# Patient Record
Sex: FEMALE | Race: WHITE | NOT HISPANIC OR LATINO | URBAN - METROPOLITAN AREA
[De-identification: names, ages, dates, MRNs, and addresses within clinical notes are randomized per-mention and may not be internally consistent; named-entity substitution may affect disease eponyms.]

---

## 2020-04-20 ENCOUNTER — INPATIENT (INPATIENT)
Facility: HOSPITAL | Age: 65
LOS: 2 days | Discharge: HOME | DRG: 340 | End: 2020-04-23
Attending: SURGERY | Admitting: SURGERY
Payer: COMMERCIAL

## 2020-04-20 ENCOUNTER — TRANSCRIPTION ENCOUNTER (OUTPATIENT)
Age: 65
End: 2020-04-20

## 2020-04-20 VITALS
HEIGHT: 62 IN | OXYGEN SATURATION: 98 % | SYSTOLIC BLOOD PRESSURE: 134 MMHG | WEIGHT: 138.01 LBS | HEART RATE: 114 BPM | RESPIRATION RATE: 16 BRPM | DIASTOLIC BLOOD PRESSURE: 81 MMHG | TEMPERATURE: 99 F

## 2020-04-20 LAB
ALBUMIN SERPL ELPH-MCNC: 4.1 G/DL — SIGNIFICANT CHANGE UP (ref 3.3–5)
ALP SERPL-CCNC: 52 U/L — SIGNIFICANT CHANGE UP (ref 40–120)
ALT FLD-CCNC: 17 U/L — SIGNIFICANT CHANGE UP (ref 10–45)
ANION GAP SERPL CALC-SCNC: 13 MMOL/L — SIGNIFICANT CHANGE UP (ref 5–17)
APPEARANCE UR: CLEAR — SIGNIFICANT CHANGE UP
AST SERPL-CCNC: 21 U/L — SIGNIFICANT CHANGE UP (ref 10–40)
BASOPHILS # BLD AUTO: 0 K/UL — SIGNIFICANT CHANGE UP (ref 0–0.2)
BASOPHILS NFR BLD AUTO: 0 % — SIGNIFICANT CHANGE UP (ref 0–2)
BILIRUB SERPL-MCNC: 0.5 MG/DL — SIGNIFICANT CHANGE UP (ref 0.2–1.2)
BILIRUB UR-MCNC: NEGATIVE — SIGNIFICANT CHANGE UP
BUN SERPL-MCNC: 15 MG/DL — SIGNIFICANT CHANGE UP (ref 7–23)
CALCIUM SERPL-MCNC: 9.4 MG/DL — SIGNIFICANT CHANGE UP (ref 8.4–10.5)
CHLORIDE SERPL-SCNC: 102 MMOL/L — SIGNIFICANT CHANGE UP (ref 96–108)
CO2 SERPL-SCNC: 24 MMOL/L — SIGNIFICANT CHANGE UP (ref 22–31)
COLOR SPEC: YELLOW — SIGNIFICANT CHANGE UP
CREAT SERPL-MCNC: 0.67 MG/DL — SIGNIFICANT CHANGE UP (ref 0.5–1.3)
DIFF PNL FLD: NEGATIVE — SIGNIFICANT CHANGE UP
EOSINOPHIL # BLD AUTO: 0 K/UL — SIGNIFICANT CHANGE UP (ref 0–0.5)
EOSINOPHIL NFR BLD AUTO: 0 % — SIGNIFICANT CHANGE UP (ref 0–6)
GLUCOSE SERPL-MCNC: 147 MG/DL — HIGH (ref 70–99)
GLUCOSE UR QL: ABNORMAL
HCT VFR BLD CALC: 38.7 % — SIGNIFICANT CHANGE UP (ref 34.5–45)
HGB BLD-MCNC: 12.6 G/DL — SIGNIFICANT CHANGE UP (ref 11.5–15.5)
KETONES UR-MCNC: ABNORMAL
LEUKOCYTE ESTERASE UR-ACNC: NEGATIVE — SIGNIFICANT CHANGE UP
LIDOCAIN IGE QN: 17 U/L — SIGNIFICANT CHANGE UP (ref 7–60)
LYMPHOCYTES # BLD AUTO: 0.38 K/UL — LOW (ref 1–3.3)
LYMPHOCYTES # BLD AUTO: 1.7 % — LOW (ref 13–44)
MCHC RBC-ENTMCNC: 28.7 PG — SIGNIFICANT CHANGE UP (ref 27–34)
MCHC RBC-ENTMCNC: 32.6 GM/DL — SIGNIFICANT CHANGE UP (ref 32–36)
MCV RBC AUTO: 88.2 FL — SIGNIFICANT CHANGE UP (ref 80–100)
MONOCYTES # BLD AUTO: 1.18 K/UL — HIGH (ref 0–0.9)
MONOCYTES NFR BLD AUTO: 5.3 % — SIGNIFICANT CHANGE UP (ref 2–14)
NEUTROPHILS # BLD AUTO: 20.58 K/UL — HIGH (ref 1.8–7.4)
NEUTROPHILS NFR BLD AUTO: 88.6 % — HIGH (ref 43–77)
NITRITE UR-MCNC: NEGATIVE — SIGNIFICANT CHANGE UP
PH UR: 6.5 — SIGNIFICANT CHANGE UP (ref 5–8)
PLATELET # BLD AUTO: 189 K/UL — SIGNIFICANT CHANGE UP (ref 150–400)
POTASSIUM SERPL-MCNC: 4.2 MMOL/L — SIGNIFICANT CHANGE UP (ref 3.5–5.3)
POTASSIUM SERPL-SCNC: 4.2 MMOL/L — SIGNIFICANT CHANGE UP (ref 3.5–5.3)
PROT SERPL-MCNC: 7.2 G/DL — SIGNIFICANT CHANGE UP (ref 6–8.3)
PROT UR-MCNC: ABNORMAL
RBC # BLD: 4.39 M/UL — SIGNIFICANT CHANGE UP (ref 3.8–5.2)
RBC # FLD: 11.9 % — SIGNIFICANT CHANGE UP (ref 10.3–14.5)
SODIUM SERPL-SCNC: 139 MMOL/L — SIGNIFICANT CHANGE UP (ref 135–145)
SP GR SPEC: 1.03 — HIGH (ref 1.01–1.02)
UROBILINOGEN FLD QL: NEGATIVE — SIGNIFICANT CHANGE UP
WBC # BLD: 22.34 K/UL — HIGH (ref 3.8–10.5)
WBC # FLD AUTO: 22.34 K/UL — HIGH (ref 3.8–10.5)

## 2020-04-20 PROCEDURE — 74177 CT ABD & PELVIS W/CONTRAST: CPT | Mod: 26

## 2020-04-20 PROCEDURE — 71045 X-RAY EXAM CHEST 1 VIEW: CPT | Mod: 26

## 2020-04-20 PROCEDURE — 99285 EMERGENCY DEPT VISIT HI MDM: CPT

## 2020-04-20 RX ORDER — ACETAMINOPHEN 500 MG
650 TABLET ORAL ONCE
Refills: 0 | Status: COMPLETED | OUTPATIENT
Start: 2020-04-20 | End: 2020-04-20

## 2020-04-20 RX ORDER — SODIUM CHLORIDE 9 MG/ML
1000 INJECTION INTRAMUSCULAR; INTRAVENOUS; SUBCUTANEOUS ONCE
Refills: 0 | Status: COMPLETED | OUTPATIENT
Start: 2020-04-20 | End: 2020-04-20

## 2020-04-20 RX ORDER — FAMOTIDINE 10 MG/ML
20 INJECTION INTRAVENOUS ONCE
Refills: 0 | Status: COMPLETED | OUTPATIENT
Start: 2020-04-20 | End: 2020-04-20

## 2020-04-20 RX ORDER — ONDANSETRON 8 MG/1
4 TABLET, FILM COATED ORAL ONCE
Refills: 0 | Status: COMPLETED | OUTPATIENT
Start: 2020-04-20 | End: 2020-04-20

## 2020-04-20 RX ADMIN — ONDANSETRON 4 MILLIGRAM(S): 8 TABLET, FILM COATED ORAL at 21:51

## 2020-04-20 RX ADMIN — Medication 650 MILLIGRAM(S): at 21:51

## 2020-04-20 RX ADMIN — FAMOTIDINE 20 MILLIGRAM(S): 10 INJECTION INTRAVENOUS at 21:52

## 2020-04-20 RX ADMIN — SODIUM CHLORIDE 1000 MILLILITER(S): 9 INJECTION INTRAMUSCULAR; INTRAVENOUS; SUBCUTANEOUS at 21:52

## 2020-04-20 NOTE — ED ADULT NURSE NOTE - OBJECTIVE STATEMENT
63 yo F 63 yo F c/o of abdominal pain Onset of pain few days ago.   Pain is non radiating. Reports diarrhea, and nausea. No  distress. No CP dizziness weakness or SOB. BS sounds + All 4Q Abdomen soft, nontender, nondistended. Last BM today. IV line placed labs drawn and sent. Provider at bedside evaluating.

## 2020-04-20 NOTE — ED PROVIDER NOTE - OBJECTIVE STATEMENT
64F presents with periumbilical abd pain since this AM assoc with heaving and loose stools. Abd pain non radiating, moderate intensity, sharp, not worsened by eating. Fever of 101F at home. Patient denies chest pain, SOB, cough, myalgias, urinary symptoms, extremity pain or swelling or other complaints. LAst took motrin this AM, no tylenol today.,

## 2020-04-20 NOTE — ED ADULT NURSE REASSESSMENT NOTE - NS ED NURSE REASSESS COMMENT FT1
Report received from Braeden ANGEL. AOx3, speaking in complete sentences. Unlabored, spontaneous respirations, NAD, O2 sat 95% RA. Pt denies Cp, SOB, n/v/d, abdominal pain at this time. Imaging completed, awaiting results. Pt aware of plan of care at this time.

## 2020-04-20 NOTE — ED PROVIDER NOTE - CLINICAL SUMMARY MEDICAL DECISION MAKING FREE TEXT BOX
64F presents with periumbilical abd pain since this AM assoc with heaving and loose stools. Abd pain non radiating, moderate intensity, sharp, not worsened by eating. Fever of 101F at home. considering sbo, bacterial vs viral gastroenteritis vs colitis. plan CT abd, ua, lipase, basic labs. CXR to look for covid like patterns as also possibility.

## 2020-04-20 NOTE — ED ADULT NURSE NOTE - NSIMPLEMENTINTERV_GEN_ALL_ED
Implemented All Universal Safety Interventions:  [place] to call system. Call bell, personal items and telephone within reach. Instruct patient to call for assistance. Room bathroom lighting operational. Non-slip footwear when patient is off stretcher. Physically safe environment: no spills, clutter or unnecessary equipment. Stretcher in lowest position, wheels locked, appropriate side rails in place.

## 2020-04-20 NOTE — ED PROVIDER NOTE - NS ED ROS FT
Constitutional: +fevers, no chills.  Eyes: no visual changes.  Ears: no ear drainage, no ear pain.  Nose: no nasal congestion.  Mouth/Throat: no sore throat.  Cardiovascular: no chest pain.  Respiratory: no shortness of breath, no wheezing, no cough  Gastrointestinal: + nausea, no vomiting, +diarrhea, +abdominal pain.  MSK: no flank pain, no back pain.  Genitourinary: no dysuria, no hematuria.  Skin: no rashes.  Neuro: no headache

## 2020-04-20 NOTE — ED PROVIDER NOTE - CARE PLAN
Principal Discharge DX:	Abdominal pain  Secondary Diagnosis:	Nausea  Secondary Diagnosis:	Fever Principal Discharge DX:	Appendicitis  Secondary Diagnosis:	Nausea  Secondary Diagnosis:	Fever

## 2020-04-20 NOTE — ED PROVIDER NOTE - ATTENDING CONTRIBUTION TO CARE
Nemes - 63yo F presents with periumbilical abd pain since this AM assoc with heaving and loose stools. Fever of 101F at home. No other stx. VS wnl except tachycardia, in moderate distress 2/2 pain and nausea, lungs clear, cardiac wnl, no JVD. Moist mucosae, pink conjunctivae. Abdomen soft w periumbilical and RLQ TTP, no CVAT. No pedal edema, no calf TTP. Poss appy vs colitis vs diverticulitis. Will hydrate, get labs, CT, reevaluate

## 2020-04-20 NOTE — ED PROVIDER NOTE - PHYSICAL EXAMINATION
GEN: Well appearing, well nourished, in no apparent distress.  HEAD: NCAT  HEENT: PERRL, Airway patent, EOMI, non-erythematous pharynx, no exudates, uvula midline, MMM, neck supple, no LAD, no JVD  LUNG: CTAB, no adventitious sounds, no retractions, no nasal flaring  CV: RRR, no murmurs,   Abd: soft, TTP diffusely, especially periumbilical, ND, neg hernandez sign, no rebound or guarding, BS+ in all quadrants, no CVAT  MSK: WWP, Pulses 2+ in extremities, No edema   Neuro:  AAOx3, Ambulatory with stable gait.  Skin: Warm and dry, no evidence of rash  Psych: normal mood and affect

## 2020-04-21 ENCOUNTER — RESULT REVIEW (OUTPATIENT)
Age: 65
End: 2020-04-21

## 2020-04-21 DIAGNOSIS — K37 UNSPECIFIED APPENDICITIS: ICD-10-CM

## 2020-04-21 DIAGNOSIS — Z98.890 OTHER SPECIFIED POSTPROCEDURAL STATES: Chronic | ICD-10-CM

## 2020-04-21 LAB
ANION GAP SERPL CALC-SCNC: 12 MMOL/L — SIGNIFICANT CHANGE UP (ref 5–17)
APTT BLD: 29.7 SEC — SIGNIFICANT CHANGE UP (ref 27.5–36.3)
BLD GP AB SCN SERPL QL: NEGATIVE — SIGNIFICANT CHANGE UP
BUN SERPL-MCNC: 12 MG/DL — SIGNIFICANT CHANGE UP (ref 7–23)
CALCIUM SERPL-MCNC: 8.8 MG/DL — SIGNIFICANT CHANGE UP (ref 8.4–10.5)
CHLORIDE SERPL-SCNC: 104 MMOL/L — SIGNIFICANT CHANGE UP (ref 96–108)
CO2 SERPL-SCNC: 23 MMOL/L — SIGNIFICANT CHANGE UP (ref 22–31)
CREAT SERPL-MCNC: 0.65 MG/DL — SIGNIFICANT CHANGE UP (ref 0.5–1.3)
CULTURE RESULTS: SIGNIFICANT CHANGE UP
GLUCOSE SERPL-MCNC: 141 MG/DL — HIGH (ref 70–99)
HCG UR QL: NEGATIVE — SIGNIFICANT CHANGE UP
HCT VFR BLD CALC: 35.2 % — SIGNIFICANT CHANGE UP (ref 34.5–45)
HGB BLD-MCNC: 11.5 G/DL — SIGNIFICANT CHANGE UP (ref 11.5–15.5)
INR BLD: 1.29 RATIO — HIGH (ref 0.88–1.16)
MAGNESIUM SERPL-MCNC: 1.8 MG/DL — SIGNIFICANT CHANGE UP (ref 1.6–2.6)
MCHC RBC-ENTMCNC: 28.6 PG — SIGNIFICANT CHANGE UP (ref 27–34)
MCHC RBC-ENTMCNC: 32.7 GM/DL — SIGNIFICANT CHANGE UP (ref 32–36)
MCV RBC AUTO: 87.6 FL — SIGNIFICANT CHANGE UP (ref 80–100)
NRBC # BLD: 0 /100 WBCS — SIGNIFICANT CHANGE UP (ref 0–0)
PHOSPHATE SERPL-MCNC: 2.7 MG/DL — SIGNIFICANT CHANGE UP (ref 2.5–4.5)
PLATELET # BLD AUTO: 178 K/UL — SIGNIFICANT CHANGE UP (ref 150–400)
POTASSIUM SERPL-MCNC: 4 MMOL/L — SIGNIFICANT CHANGE UP (ref 3.5–5.3)
POTASSIUM SERPL-SCNC: 4 MMOL/L — SIGNIFICANT CHANGE UP (ref 3.5–5.3)
PROTHROM AB SERPL-ACNC: 15 SEC — HIGH (ref 10–12.9)
RBC # BLD: 4.02 M/UL — SIGNIFICANT CHANGE UP (ref 3.8–5.2)
RBC # FLD: 12.1 % — SIGNIFICANT CHANGE UP (ref 10.3–14.5)
RH IG SCN BLD-IMP: POSITIVE — SIGNIFICANT CHANGE UP
RH IG SCN BLD-IMP: POSITIVE — SIGNIFICANT CHANGE UP
SARS-COV-2 RNA SPEC QL NAA+PROBE: SIGNIFICANT CHANGE UP
SODIUM SERPL-SCNC: 139 MMOL/L — SIGNIFICANT CHANGE UP (ref 135–145)
SPECIMEN SOURCE: SIGNIFICANT CHANGE UP
WBC # BLD: 21.76 K/UL — HIGH (ref 3.8–10.5)
WBC # FLD AUTO: 21.76 K/UL — HIGH (ref 3.8–10.5)

## 2020-04-21 PROCEDURE — 88304 TISSUE EXAM BY PATHOLOGIST: CPT | Mod: 26

## 2020-04-21 PROCEDURE — 88300 SURGICAL PATH GROSS: CPT | Mod: 26,59

## 2020-04-21 RX ORDER — HYDROMORPHONE HYDROCHLORIDE 2 MG/ML
1 INJECTION INTRAMUSCULAR; INTRAVENOUS; SUBCUTANEOUS EVERY 4 HOURS
Refills: 0 | Status: DISCONTINUED | OUTPATIENT
Start: 2020-04-21 | End: 2020-04-21

## 2020-04-21 RX ORDER — PIPERACILLIN AND TAZOBACTAM 4; .5 G/20ML; G/20ML
3.38 INJECTION, POWDER, LYOPHILIZED, FOR SOLUTION INTRAVENOUS EVERY 8 HOURS
Refills: 0 | Status: DISCONTINUED | OUTPATIENT
Start: 2020-04-21 | End: 2020-04-21

## 2020-04-21 RX ORDER — MAGNESIUM SULFATE 500 MG/ML
1 VIAL (ML) INJECTION ONCE
Refills: 0 | Status: COMPLETED | OUTPATIENT
Start: 2020-04-21 | End: 2020-04-21

## 2020-04-21 RX ORDER — PIPERACILLIN AND TAZOBACTAM 4; .5 G/20ML; G/20ML
3.38 INJECTION, POWDER, LYOPHILIZED, FOR SOLUTION INTRAVENOUS EVERY 8 HOURS
Refills: 0 | Status: DISCONTINUED | OUTPATIENT
Start: 2020-04-21 | End: 2020-04-23

## 2020-04-21 RX ORDER — MORPHINE SULFATE 50 MG/1
4 CAPSULE, EXTENDED RELEASE ORAL EVERY 4 HOURS
Refills: 0 | Status: DISCONTINUED | OUTPATIENT
Start: 2020-04-21 | End: 2020-04-22

## 2020-04-21 RX ORDER — ONDANSETRON 8 MG/1
4 TABLET, FILM COATED ORAL ONCE
Refills: 0 | Status: COMPLETED | OUTPATIENT
Start: 2020-04-21 | End: 2020-04-21

## 2020-04-21 RX ORDER — SODIUM CHLORIDE 9 MG/ML
1000 INJECTION, SOLUTION INTRAVENOUS
Refills: 0 | Status: DISCONTINUED | OUTPATIENT
Start: 2020-04-21 | End: 2020-04-21

## 2020-04-21 RX ORDER — ACETAMINOPHEN 500 MG
1000 TABLET ORAL EVERY 6 HOURS
Refills: 0 | Status: COMPLETED | OUTPATIENT
Start: 2020-04-21 | End: 2020-04-21

## 2020-04-21 RX ORDER — HYDROMORPHONE HYDROCHLORIDE 2 MG/ML
0.5 INJECTION INTRAMUSCULAR; INTRAVENOUS; SUBCUTANEOUS EVERY 4 HOURS
Refills: 0 | Status: DISCONTINUED | OUTPATIENT
Start: 2020-04-21 | End: 2020-04-21

## 2020-04-21 RX ORDER — PIPERACILLIN AND TAZOBACTAM 4; .5 G/20ML; G/20ML
3.38 INJECTION, POWDER, LYOPHILIZED, FOR SOLUTION INTRAVENOUS ONCE
Refills: 0 | Status: COMPLETED | OUTPATIENT
Start: 2020-04-21 | End: 2020-04-21

## 2020-04-21 RX ORDER — SODIUM CHLORIDE 9 MG/ML
1000 INJECTION INTRAMUSCULAR; INTRAVENOUS; SUBCUTANEOUS ONCE
Refills: 0 | Status: DISCONTINUED | OUTPATIENT
Start: 2020-04-21 | End: 2020-04-21

## 2020-04-21 RX ORDER — MORPHINE SULFATE 50 MG/1
2 CAPSULE, EXTENDED RELEASE ORAL ONCE
Refills: 0 | Status: DISCONTINUED | OUTPATIENT
Start: 2020-04-21 | End: 2020-04-21

## 2020-04-21 RX ORDER — HEPARIN SODIUM 5000 [USP'U]/ML
5000 INJECTION INTRAVENOUS; SUBCUTANEOUS EVERY 8 HOURS
Refills: 0 | Status: DISCONTINUED | OUTPATIENT
Start: 2020-04-21 | End: 2020-04-21

## 2020-04-21 RX ORDER — ONDANSETRON 8 MG/1
4 TABLET, FILM COATED ORAL ONCE
Refills: 0 | Status: DISCONTINUED | OUTPATIENT
Start: 2020-04-21 | End: 2020-04-21

## 2020-04-21 RX ORDER — PIPERACILLIN AND TAZOBACTAM 4; .5 G/20ML; G/20ML
3.38 INJECTION, POWDER, LYOPHILIZED, FOR SOLUTION INTRAVENOUS ONCE
Refills: 0 | Status: DISCONTINUED | OUTPATIENT
Start: 2020-04-21 | End: 2020-04-21

## 2020-04-21 RX ORDER — HYDROMORPHONE HYDROCHLORIDE 2 MG/ML
0.25 INJECTION INTRAMUSCULAR; INTRAVENOUS; SUBCUTANEOUS
Refills: 0 | Status: DISCONTINUED | OUTPATIENT
Start: 2020-04-21 | End: 2020-04-21

## 2020-04-21 RX ORDER — ACETAMINOPHEN 500 MG
1000 TABLET ORAL ONCE
Refills: 0 | Status: DISCONTINUED | OUTPATIENT
Start: 2020-04-21 | End: 2020-04-21

## 2020-04-21 RX ORDER — ENOXAPARIN SODIUM 100 MG/ML
40 INJECTION SUBCUTANEOUS DAILY
Refills: 0 | Status: DISCONTINUED | OUTPATIENT
Start: 2020-04-21 | End: 2020-04-23

## 2020-04-21 RX ORDER — MORPHINE SULFATE 50 MG/1
2 CAPSULE, EXTENDED RELEASE ORAL EVERY 4 HOURS
Refills: 0 | Status: DISCONTINUED | OUTPATIENT
Start: 2020-04-21 | End: 2020-04-22

## 2020-04-21 RX ADMIN — HEPARIN SODIUM 5000 UNIT(S): 5000 INJECTION INTRAVENOUS; SUBCUTANEOUS at 05:22

## 2020-04-21 RX ADMIN — PIPERACILLIN AND TAZOBACTAM 25 GRAM(S): 4; .5 INJECTION, POWDER, LYOPHILIZED, FOR SOLUTION INTRAVENOUS at 17:49

## 2020-04-21 RX ADMIN — Medication 100 GRAM(S): at 06:04

## 2020-04-21 RX ADMIN — Medication 400 MILLIGRAM(S): at 05:22

## 2020-04-21 RX ADMIN — MORPHINE SULFATE 2 MILLIGRAM(S): 50 CAPSULE, EXTENDED RELEASE ORAL at 00:59

## 2020-04-21 RX ADMIN — PIPERACILLIN AND TAZOBACTAM 25 GRAM(S): 4; .5 INJECTION, POWDER, LYOPHILIZED, FOR SOLUTION INTRAVENOUS at 23:22

## 2020-04-21 RX ADMIN — HYDROMORPHONE HYDROCHLORIDE 1 MILLIGRAM(S): 2 INJECTION INTRAMUSCULAR; INTRAVENOUS; SUBCUTANEOUS at 03:03

## 2020-04-21 RX ADMIN — Medication 400 MILLIGRAM(S): at 17:49

## 2020-04-21 RX ADMIN — SODIUM CHLORIDE 100 MILLILITER(S): 9 INJECTION, SOLUTION INTRAVENOUS at 03:10

## 2020-04-21 RX ADMIN — MORPHINE SULFATE 2 MILLIGRAM(S): 50 CAPSULE, EXTENDED RELEASE ORAL at 19:57

## 2020-04-21 RX ADMIN — ENOXAPARIN SODIUM 40 MILLIGRAM(S): 100 INJECTION SUBCUTANEOUS at 17:53

## 2020-04-21 RX ADMIN — PIPERACILLIN AND TAZOBACTAM 200 GRAM(S): 4; .5 INJECTION, POWDER, LYOPHILIZED, FOR SOLUTION INTRAVENOUS at 00:59

## 2020-04-21 RX ADMIN — Medication 400 MILLIGRAM(S): at 12:19

## 2020-04-21 RX ADMIN — SODIUM CHLORIDE 100 MILLILITER(S): 9 INJECTION, SOLUTION INTRAVENOUS at 05:22

## 2020-04-21 RX ADMIN — ONDANSETRON 4 MILLIGRAM(S): 8 TABLET, FILM COATED ORAL at 00:59

## 2020-04-21 RX ADMIN — Medication 400 MILLIGRAM(S): at 23:22

## 2020-04-21 NOTE — H&P ADULT - NSHPREVIEWOFSYSTEMS_GEN_ALL_CORE
REVIEW OF SYSTEMS:    CONSTITUTIONAL: No weakness, fevers or chills  EYES/ENT: No visual changes;  No vertigo or throat pain   NECK: No pain or stiffness  RESPIRATORY: No cough, wheezing, hemoptysis; No shortness of breath  CARDIOVASCULAR: No chest pain or palpitations  GASTROINTESTINAL: See HPI   GENITOURINARY: No dysuria, frequency or hematuria  NEUROLOGICAL: No numbness or weakness  SKIN: No itching, rashes

## 2020-04-21 NOTE — H&P ADULT - HISTORY OF PRESENT ILLNESS
65 yo woman with no PMH / PSH presenting with one day of periumbilical abdominal pain radiating to the RLQ associated with fever to 101 at home, nausea and emesis. She had a virtual physician visit where she was recommended to seek ED evaluation. She denies cough or SOB. Her last colonoscopy was in 2016, complicated by a splenic hematoma requiring hospitalization. No acute pathology identified on 2016 colonoscopy. Of note, patient is resistant to undergoing further screening colonoscopies. Her only medication is motrin for wrist pain.     In the ED, she is afebrile. She was initially tachycardic to 114 but is now hemodynamically normal after 1L NS bolus. On exam, she is softly tender in her RLQ without rebound or guarding. She has a leukocytosis of 22.4. CT reveals an enlarged, retrocecal appendix with an 11mm appendicolith at the base, without evidence of perforation or abscess.

## 2020-04-21 NOTE — PRE-ANESTHESIA EVALUATION ADULT - NSANTHOSAYNRD_GEN_A_CORE
No. BRANDEE screening performed.  STOP BANG Legend: 0-2 = LOW Risk; 3-4 = INTERMEDIATE Risk; 5-8 = HIGH Risk

## 2020-04-21 NOTE — PROGRESS NOTE ADULT - SUBJECTIVE AND OBJECTIVE BOX
SURGERY POST-OP NOTE    S/P laparoscopic appendectomy     S: Patient doing well, denies fevers, chills, nausea, emesis, chest pain, SOB. Pain well controlled. Not yet ambulating, not yet passing flatus or BM.    O: Vital Signs  T(C): 36.7 (04-21 @ 15:42), Max: 37.6 (04-21 @ 04:15)  HR: 83 (04-21 @ 15:42) (74 - 114)  BP: 92/58 (04-21 @ 15:42) (90/58 - 134/81)  RR: 18 (04-21 @ 15:42) (16 - 20)  SpO2: 95% (04-21 @ 15:42) (93% - 100%)  04-21-20 @ 07:01  -  04-21-20 @ 16:47  --------------------------------------------------------  IN: 1425 mL / OUT: 0 mL / NET: 1425 mL      General: alert and oriented, NAD  Resp: airway patent, respirations unlabored  CVS: regular rate and rhythm  Abdomen: soft, appropriately tender, nondistended, incisions c/d/i  Extremities: no edema  Skin: warm, dry, appropriate color                          11.5   21.76 )-----------( 178      ( 21 Apr 2020 03:23 )             35.2   04-21    139  |  104  |  12  ----------------------------<  141<H>  4.0   |  23  |  0.65    Ca    8.8      21 Apr 2020 03:23  Phos  2.7     04-21  Mg     1.8     04-21    TPro  7.2  /  Alb  4.1  /  TBili  0.5  /  DBili  x   /  AST  21  /  ALT  17  /  AlkPhos  52  04-20      A/P:  - Pain control  - F/U AM labs SURGERY POST-OP NOTE    S/P laparoscopic appendectomy     S: Patient doing well, denies fevers, chills, nausea, emesis, chest pain, SOB. Pain well controlled. Not yet ambulating, not yet passing flatus or BM.    O: Vital Signs  T(C): 36.7 (04-21 @ 15:42), Max: 37.6 (04-21 @ 04:15)  HR: 83 (04-21 @ 15:42) (74 - 114)  BP: 92/58 (04-21 @ 15:42) (90/58 - 134/81)  RR: 18 (04-21 @ 15:42) (16 - 20)  SpO2: 95% (04-21 @ 15:42) (93% - 100%)  04-21-20 @ 07:01  -  04-21-20 @ 16:47  --------------------------------------------------------  IN: 1425 mL / OUT: 0 mL / NET: 1425 mL      General: alert and oriented, NAD  Resp: airway patent, respirations unlabored  CVS: regular rate and rhythm  Abdomen: soft, appropriately tender, nondistended, incisions c/d/i  Extremities: no edema  Skin: warm, dry, appropriate color                          11.5   21.76 )-----------( 178      ( 21 Apr 2020 03:23 )             35.2   04-21    139  |  104  |  12  ----------------------------<  141<H>  4.0   |  23  |  0.65    Ca    8.8      21 Apr 2020 03:23  Phos  2.7     04-21  Mg     1.8     04-21    TPro  7.2  /  Alb  4.1  /  TBili  0.5  /  DBili  x   /  AST  21  /  ALT  17  /  AlkPhos  52  04-20 SURGERY POST-OP NOTE    S/P laparoscopic appendectomy demonstrating gangrenous appendix with large perforation and appendicolith at base    S: Patient doing well, denies fevers, chills, nausea, emesis, chest pain, SOB. Pain well controlled. Not yet ambulating, not yet passing flatus or BM.    O: Vital Signs  T(C): 36.7 (04-21 @ 15:42), Max: 37.6 (04-21 @ 04:15)  HR: 83 (04-21 @ 15:42) (74 - 114)  BP: 92/58 (04-21 @ 15:42) (90/58 - 134/81)  RR: 18 (04-21 @ 15:42) (16 - 20)  SpO2: 95% (04-21 @ 15:42) (93% - 100%)  04-21-20 @ 07:01  -  04-21-20 @ 16:47  --------------------------------------------------------  IN: 1425 mL / OUT: 0 mL / NET: 1425 mL      General: alert and oriented, NAD  Resp: airway patent, respirations unlabored  CVS: regular rate and rhythm  Abdomen: soft, appropriately tender, nondistended, incisions c/d/i  Extremities: no edema  Skin: warm, dry, appropriate color                          11.5   21.76 )-----------( 178      ( 21 Apr 2020 03:23 )             35.2   04-21    139  |  104  |  12  ----------------------------<  141<H>  4.0   |  23  |  0.65    Ca    8.8      21 Apr 2020 03:23  Phos  2.7     04-21  Mg     1.8     04-21    TPro  7.2  /  Alb  4.1  /  TBili  0.5  /  DBili  x   /  AST  21  /  ALT  17  /  AlkPhos  52  04-20

## 2020-04-21 NOTE — H&P ADULT - NSHPPHYSICALEXAM_GEN_ALL_CORE
Gen: Well-developed, well-nourished in NAD.   Neuro: AOx3  HEENT: anicteric, symmetric facial movement.   Pulm: Unlabored on room air, no cough or wheezing  CV: RRR  Abd: Soft, nondistended, RLQ tenderness without rebound or guarding. No abdominal scars.   Skin: No excoriations, ecchymosis or erythema.   Ext: NOGUERA, no edema  Psych: appropriate affect

## 2020-04-21 NOTE — ED ADULT NURSE REASSESSMENT NOTE - NS ED NURSE REASSESS COMMENT FT1
Pt admitted to surgery, diagnosis of fever, nausea, abdominal pain and appendicitis. Awaiting bed placement at this time. Pt aware of plan of care at this time.

## 2020-04-21 NOTE — PROGRESS NOTE ADULT - ASSESSMENT
64F POD0 s/p laparoscopic appendectomy for gangrenous appendix with large perforation and appendicolith at base. Doing well post-operatively with adequate pain control.    - Advance to CLD  - C/w IV Zosyn  - Pain control: IV tylenol ATC, morphine PRN  - DVT PPX: lovenox  - OOB encouraged    [facility] [id number] 64F POD0 s/p laparoscopic appendectomy for gangrenous appendix with large perforation and appendicolith at base. Doing well post-operatively with adequate pain control.    - Advance to CLD  - C/w IV Zosyn  - Pain control: IV tylenol ATC, morphine PRN  - DVT PPX: lovenox  - OOB encouraged  - F/U AM labs    [facility] [id number] 64F POD0 s/p laparoscopic appendectomy for gangrenous appendicitis with large perforation and appendicolith at base. Doing well post-operatively with adequate pain control.    - Advance to CLD  - C/w IV Zosyn  - Pain control: IV tylenol ATC, morphine PRN  - DVT PPX: lovenox  - OOB encouraged  - F/U AM labs    [facility] [id number]

## 2020-04-21 NOTE — H&P ADULT - ASSESSMENT
65 yo woman with acute appendicitis with a large appendicolith at appendiceal base     - NPO / IVF  - Zosyn   - Likely OR for lap appy, possible open     Will discuss above with Surgical Attending  VINICIUS Villareal MD PGY4  [id number]

## 2020-04-22 ENCOUNTER — TRANSCRIPTION ENCOUNTER (OUTPATIENT)
Age: 65
End: 2020-04-22

## 2020-04-22 LAB
ANION GAP SERPL CALC-SCNC: 9 MMOL/L — SIGNIFICANT CHANGE UP (ref 5–17)
BUN SERPL-MCNC: 17 MG/DL — SIGNIFICANT CHANGE UP (ref 7–23)
CALCIUM SERPL-MCNC: 8.2 MG/DL — LOW (ref 8.4–10.5)
CHLORIDE SERPL-SCNC: 107 MMOL/L — SIGNIFICANT CHANGE UP (ref 96–108)
CO2 SERPL-SCNC: 22 MMOL/L — SIGNIFICANT CHANGE UP (ref 22–31)
CREAT SERPL-MCNC: 0.74 MG/DL — SIGNIFICANT CHANGE UP (ref 0.5–1.3)
GLUCOSE SERPL-MCNC: 116 MG/DL — HIGH (ref 70–99)
HCT VFR BLD CALC: 32.5 % — LOW (ref 34.5–45)
HGB BLD-MCNC: 10.3 G/DL — LOW (ref 11.5–15.5)
MAGNESIUM SERPL-MCNC: 2.3 MG/DL — SIGNIFICANT CHANGE UP (ref 1.6–2.6)
MCHC RBC-ENTMCNC: 28.1 PG — SIGNIFICANT CHANGE UP (ref 27–34)
MCHC RBC-ENTMCNC: 31.7 GM/DL — LOW (ref 32–36)
MCV RBC AUTO: 88.8 FL — SIGNIFICANT CHANGE UP (ref 80–100)
NRBC # BLD: 0 /100 WBCS — SIGNIFICANT CHANGE UP (ref 0–0)
PHOSPHATE SERPL-MCNC: 1.7 MG/DL — LOW (ref 2.5–4.5)
PLATELET # BLD AUTO: 177 K/UL — SIGNIFICANT CHANGE UP (ref 150–400)
POTASSIUM SERPL-MCNC: 4.1 MMOL/L — SIGNIFICANT CHANGE UP (ref 3.5–5.3)
POTASSIUM SERPL-SCNC: 4.1 MMOL/L — SIGNIFICANT CHANGE UP (ref 3.5–5.3)
RBC # BLD: 3.66 M/UL — LOW (ref 3.8–5.2)
RBC # FLD: 12.5 % — SIGNIFICANT CHANGE UP (ref 10.3–14.5)
SODIUM SERPL-SCNC: 138 MMOL/L — SIGNIFICANT CHANGE UP (ref 135–145)
WBC # BLD: 10.11 K/UL — SIGNIFICANT CHANGE UP (ref 3.8–10.5)
WBC # FLD AUTO: 10.11 K/UL — SIGNIFICANT CHANGE UP (ref 3.8–10.5)

## 2020-04-22 RX ORDER — POTASSIUM PHOSPHATE, MONOBASIC POTASSIUM PHOSPHATE, DIBASIC 236; 224 MG/ML; MG/ML
15 INJECTION, SOLUTION INTRAVENOUS ONCE
Refills: 0 | Status: COMPLETED | OUTPATIENT
Start: 2020-04-22 | End: 2020-04-22

## 2020-04-22 RX ORDER — OXYCODONE HYDROCHLORIDE 5 MG/1
1 TABLET ORAL
Qty: 8 | Refills: 0
Start: 2020-04-22 | End: 2020-04-23

## 2020-04-22 RX ORDER — IBUPROFEN 200 MG
1 TABLET ORAL
Qty: 0 | Refills: 0 | DISCHARGE
Start: 2020-04-22

## 2020-04-22 RX ORDER — IBUPROFEN 200 MG
600 TABLET ORAL EVERY 6 HOURS
Refills: 0 | Status: DISCONTINUED | OUTPATIENT
Start: 2020-04-22 | End: 2020-04-23

## 2020-04-22 RX ORDER — IBUPROFEN 200 MG
0 TABLET ORAL
Qty: 0 | Refills: 0 | DISCHARGE

## 2020-04-22 RX ORDER — OXYCODONE HYDROCHLORIDE 5 MG/1
5 TABLET ORAL EVERY 4 HOURS
Refills: 0 | Status: DISCONTINUED | OUTPATIENT
Start: 2020-04-22 | End: 2020-04-23

## 2020-04-22 RX ORDER — ACETAMINOPHEN 500 MG
2 TABLET ORAL
Qty: 0 | Refills: 0 | DISCHARGE
Start: 2020-04-22

## 2020-04-22 RX ORDER — ACETAMINOPHEN 500 MG
650 TABLET ORAL EVERY 6 HOURS
Refills: 0 | Status: DISCONTINUED | OUTPATIENT
Start: 2020-04-22 | End: 2020-04-23

## 2020-04-22 RX ORDER — OXYCODONE HYDROCHLORIDE 5 MG/1
10 TABLET ORAL EVERY 4 HOURS
Refills: 0 | Status: DISCONTINUED | OUTPATIENT
Start: 2020-04-22 | End: 2020-04-23

## 2020-04-22 RX ADMIN — PIPERACILLIN AND TAZOBACTAM 25 GRAM(S): 4; .5 INJECTION, POWDER, LYOPHILIZED, FOR SOLUTION INTRAVENOUS at 17:29

## 2020-04-22 RX ADMIN — OXYCODONE HYDROCHLORIDE 5 MILLIGRAM(S): 5 TABLET ORAL at 15:22

## 2020-04-22 RX ADMIN — MORPHINE SULFATE 2 MILLIGRAM(S): 50 CAPSULE, EXTENDED RELEASE ORAL at 12:25

## 2020-04-22 RX ADMIN — POTASSIUM PHOSPHATE, MONOBASIC POTASSIUM PHOSPHATE, DIBASIC 62.5 MILLIMOLE(S): 236; 224 INJECTION, SOLUTION INTRAVENOUS at 10:21

## 2020-04-22 RX ADMIN — ENOXAPARIN SODIUM 40 MILLIGRAM(S): 100 INJECTION SUBCUTANEOUS at 12:16

## 2020-04-22 RX ADMIN — PIPERACILLIN AND TAZOBACTAM 25 GRAM(S): 4; .5 INJECTION, POWDER, LYOPHILIZED, FOR SOLUTION INTRAVENOUS at 12:15

## 2020-04-22 RX ADMIN — Medication 600 MILLIGRAM(S): at 17:28

## 2020-04-22 RX ADMIN — Medication 650 MILLIGRAM(S): at 17:28

## 2020-04-22 RX ADMIN — MORPHINE SULFATE 2 MILLIGRAM(S): 50 CAPSULE, EXTENDED RELEASE ORAL at 04:06

## 2020-04-22 RX ADMIN — PIPERACILLIN AND TAZOBACTAM 25 GRAM(S): 4; .5 INJECTION, POWDER, LYOPHILIZED, FOR SOLUTION INTRAVENOUS at 23:09

## 2020-04-22 RX ADMIN — OXYCODONE HYDROCHLORIDE 10 MILLIGRAM(S): 5 TABLET ORAL at 21:01

## 2020-04-22 NOTE — DISCHARGE NOTE PROVIDER - HOSPITAL COURSE
Pt underwent laparoscopic appendectomy on 4/21/2020 and was found to have a gangrenous appendix with large perforation and appendicolith at base.  Pt tolerated procedure well and was transferred to the recovery room and then the floor without incidence.  Pt was mainly managed for postoperative pain, and wound care, as well as close monitoring of fluid resuscitation and return of GI function.  Pt has been tolerating a diet, voiding, ambulating, hemodynamically stable, and the pain is now well controlled.   Pt is ready for discharge home in stable condition, and will follow up in two weeks as an outpatient with Dr. Dobbs. Pt underwent laparoscopic appendectomy on 4/21/2020 and was found to have a gangrenous appendix with large perforation and appendicolith at base.  Pt tolerated procedure well and was transferred to the recovery room and then the floor without incidence.  Pt was mainly managed for postoperative pain, and wound care, as well as close monitoring of fluid resuscitation and return of GI function.  Pt has been tolerating a diet, voiding, ambulating, hemodynamically stable, and the pain is now well controlled.   Pt is ready for discharge home in stable condition, and will follow up in two weeks as an outpatient with Dr. Dobbs.        Once you start passing flatus, you may take a stool softener such as miralax, if necessary.

## 2020-04-22 NOTE — DISCHARGE NOTE PROVIDER - NSDCFUADDINST_GEN_ALL_CORE_FT
PAIN CONTROL: You may take 650 mg of Tylenol every 4-6 hours. Do not exceed 4 grams of Tylenol daily. Take oxycodone as needed for severe pain, one tab every 6 hours. Do not exceed 4 tabs daily.  ANTIBIOTICS: Please complete 1 week course of Augmentin, sent to pharmacy.  BATHING: Please do not submerge wound underwater. You may shower after 48 hours and/or sponge bathe.  ACTIVITY: No heavy lifting or straining. Otherwise, you may return to your usual level of physical activity. If you are taking narcotic pain medication (such as oxycodone), do NOT drive a car, operate machinery or make important decisions.  DIET: Return to your usual diet.  NOTIFY YOUR SURGEON IF: You have any bleeding that does not stop, any pus draining from your wound, any fever (over 100.4 F) or chills, persistent nausea/vomiting, persistent diarrhea, or if your pain is not controlled on your discharge pain medications.  FOLLOW-UP:  1. Please call to make a follow-up appointment within 1-2 weeks of discharge  2. Please follow up with your primary care physician in 1-2 weeks regarding your hospitalization.

## 2020-04-22 NOTE — DISCHARGE NOTE PROVIDER - NSDCCPCAREPLAN_GEN_ALL_CORE_FT
PRINCIPAL DISCHARGE DIAGNOSIS  Diagnosis: Appendicitis  Assessment and Plan of Treatment:       SECONDARY DISCHARGE DIAGNOSES  Diagnosis: Fever  Assessment and Plan of Treatment:     Diagnosis: Nausea  Assessment and Plan of Treatment:

## 2020-04-22 NOTE — PROGRESS NOTE ADULT - ASSESSMENT
64F POD1 s/p laparoscopic appendectomy for gangrenous appendicitis with large perforation and appendicolith at base. Doing well post-operatively, tolerating CLD.    - C/w CLD, ADAT  - C/w IV Zosyn  - Monitor bowel function  - Pain control: IV tylenol ATC, morphine PRN  - DVT PPX: lovenox  - OOB encouraged  - F/U AM labs    [facility] [id number]

## 2020-04-22 NOTE — PROGRESS NOTE ADULT - ATTENDING COMMENTS
I have seen and examined the patient. I agree with the above surgery resident's note.  adv diet as danny  home on augmentin if danny po  f/u in office 2 weeks w dr elliott

## 2020-04-22 NOTE — DISCHARGE NOTE PROVIDER - CARE PROVIDER_API CALL
Stephen Dobbs)  Surgery  [address], [address]  [place] [address]  Phone: [phone]  Fax: [phone]  Follow Up Time: 2 weeks

## 2020-04-23 ENCOUNTER — TRANSCRIPTION ENCOUNTER (OUTPATIENT)
Age: 65
End: 2020-04-23

## 2020-04-23 VITALS
HEART RATE: 87 BPM | TEMPERATURE: 98 F | RESPIRATION RATE: 18 BRPM | SYSTOLIC BLOOD PRESSURE: 103 MMHG | OXYGEN SATURATION: 93 % | DIASTOLIC BLOOD PRESSURE: 60 MMHG

## 2020-04-23 LAB — SURGICAL PATHOLOGY STUDY: SIGNIFICANT CHANGE UP

## 2020-04-23 PROCEDURE — 96376 TX/PRO/DX INJ SAME DRUG ADON: CPT

## 2020-04-23 PROCEDURE — 86900 BLOOD TYPING SEROLOGIC ABO: CPT

## 2020-04-23 PROCEDURE — 87086 URINE CULTURE/COLONY COUNT: CPT

## 2020-04-23 PROCEDURE — 71045 X-RAY EXAM CHEST 1 VIEW: CPT

## 2020-04-23 PROCEDURE — 86901 BLOOD TYPING SEROLOGIC RH(D): CPT

## 2020-04-23 PROCEDURE — 80053 COMPREHEN METABOLIC PANEL: CPT

## 2020-04-23 PROCEDURE — 81001 URINALYSIS AUTO W/SCOPE: CPT

## 2020-04-23 PROCEDURE — 88304 TISSUE EXAM BY PATHOLOGIST: CPT

## 2020-04-23 PROCEDURE — 85610 PROTHROMBIN TIME: CPT

## 2020-04-23 PROCEDURE — 83735 ASSAY OF MAGNESIUM: CPT

## 2020-04-23 PROCEDURE — 88300 SURGICAL PATH GROSS: CPT

## 2020-04-23 PROCEDURE — 99285 EMERGENCY DEPT VISIT HI MDM: CPT | Mod: 25

## 2020-04-23 PROCEDURE — 87635 SARS-COV-2 COVID-19 AMP PRB: CPT

## 2020-04-23 PROCEDURE — 84100 ASSAY OF PHOSPHORUS: CPT

## 2020-04-23 PROCEDURE — 83690 ASSAY OF LIPASE: CPT

## 2020-04-23 PROCEDURE — 96374 THER/PROPH/DIAG INJ IV PUSH: CPT

## 2020-04-23 PROCEDURE — 85730 THROMBOPLASTIN TIME PARTIAL: CPT

## 2020-04-23 PROCEDURE — 96375 TX/PRO/DX INJ NEW DRUG ADDON: CPT

## 2020-04-23 PROCEDURE — 85027 COMPLETE CBC AUTOMATED: CPT

## 2020-04-23 PROCEDURE — 80048 BASIC METABOLIC PNL TOTAL CA: CPT

## 2020-04-23 PROCEDURE — 86850 RBC ANTIBODY SCREEN: CPT

## 2020-04-23 PROCEDURE — C1889: CPT

## 2020-04-23 PROCEDURE — 81025 URINE PREGNANCY TEST: CPT

## 2020-04-23 PROCEDURE — 74177 CT ABD & PELVIS W/CONTRAST: CPT

## 2020-04-23 RX ORDER — ONDANSETRON 8 MG/1
4 TABLET, FILM COATED ORAL ONCE
Refills: 0 | Status: COMPLETED | OUTPATIENT
Start: 2020-04-23 | End: 2020-04-23

## 2020-04-23 RX ORDER — ONDANSETRON 8 MG/1
1 TABLET, FILM COATED ORAL
Qty: 4 | Refills: 0
Start: 2020-04-23

## 2020-04-23 RX ADMIN — Medication 600 MILLIGRAM(S): at 06:23

## 2020-04-23 RX ADMIN — OXYCODONE HYDROCHLORIDE 5 MILLIGRAM(S): 5 TABLET ORAL at 13:44

## 2020-04-23 RX ADMIN — Medication 650 MILLIGRAM(S): at 11:39

## 2020-04-23 RX ADMIN — Medication 600 MILLIGRAM(S): at 00:53

## 2020-04-23 RX ADMIN — ENOXAPARIN SODIUM 40 MILLIGRAM(S): 100 INJECTION SUBCUTANEOUS at 11:39

## 2020-04-23 RX ADMIN — Medication 650 MILLIGRAM(S): at 00:56

## 2020-04-23 RX ADMIN — PIPERACILLIN AND TAZOBACTAM 25 GRAM(S): 4; .5 INJECTION, POWDER, LYOPHILIZED, FOR SOLUTION INTRAVENOUS at 06:54

## 2020-04-23 RX ADMIN — Medication 600 MILLIGRAM(S): at 11:39

## 2020-04-23 RX ADMIN — Medication 650 MILLIGRAM(S): at 06:23

## 2020-04-23 RX ADMIN — ONDANSETRON 4 MILLIGRAM(S): 8 TABLET, FILM COATED ORAL at 10:07

## 2020-04-23 NOTE — PROGRESS NOTE ADULT - ASSESSMENT
64F POD1 s/p laparoscopic appendectomy for gangrenous appendicitis with large perforation and appendicolith at base. Doing well post-operatively, tolerating regular diet. Awaiting return of bowel function. Ready for discharge today.    Plan  - Regular diet  - C/w IV Zosyn while inpatient --> 1 week augmentin outpatient  - Monitor bowel function  - Pain control: IV tylenol ATC, morphine PRN  - DVT PPX: lovenox  - OOB encouraged    [facility] [id number]

## 2020-04-23 NOTE — PROGRESS NOTE ADULT - ATTENDING COMMENTS
I have seen and examined the patient. I agree with the above surgery resident's note.  home today on abx

## 2020-04-23 NOTE — PROGRESS NOTE ADULT - SUBJECTIVE AND OBJECTIVE BOX
Interval Events:  - No acute events overnight  - Advanced to regular diet yesterday    S: Patient doing well, denies fevers, chills, nausea, emesis, chest pain, SOB. Not yet passing gas or having BM. Tolerating regular diet. +OOB. Pain controlled.    O: Vital Signs  T(C): 36.7 (04-22 @ 23:35), Max: 37.1 (04-22 @ 16:46)  HR: 90 (04-22 @ 23:35) (83 - 97)  BP: 103/67 (04-22 @ 23:35) (102/68 - 106/71)  RR: 18 (04-22 @ 23:35) (18 - 18)  SpO2: 97% (04-22 @ 23:35) (93% - 97%)  04-21-20 @ 07:01  -  04-22-20 @ 07:00  --------------------------------------------------------  IN: 1425 mL / OUT: 0 mL / NET: 1425 mL    04-22-20 @ 07:01  -  04-23-20 @ 00:47  --------------------------------------------------------  IN: 560 mL / OUT: 400 mL / NET: 160 mL        Physical Exam:  General: NAD  Resp: respirations unlabored  Abdomen: soft, nontender, nondistended, incisions c/d/i  Skin: warm, dry, appropriate color                          10.3   10.11 )-----------( 177      ( 22 Apr 2020 05:52 )             32.5   04-22    138  |  107  |  17  ----------------------------<  116<H>  4.1   |  22  |  0.74    Ca    8.2<L>      22 Apr 2020 05:40  Phos  1.7     04-22  Mg     2.3     04-22

## 2020-04-23 NOTE — DISCHARGE NOTE NURSING/CASE MANAGEMENT/SOCIAL WORK - PATIENT PORTAL LINK FT
You can access the FollowMyHealth Patient Portal offered by [facility] by registering at the following website: [email or web address]. By joining [facility]’s FollowMyHealth portal, you will also be able to view your health information using other applications (apps) compatible with our system.

## 2020-04-24 DIAGNOSIS — Z09 ENCOUNTER FOR FOLLOW-UP EXAMINATION AFTER COMPLETED TREATMENT FOR CONDITIONS OTHER THAN MALIGNANT NEOPLASM: ICD-10-CM

## 2020-04-24 DIAGNOSIS — K38.9 DISEASE OF APPENDIX, UNSPECIFIED: ICD-10-CM

## 2020-04-24 DIAGNOSIS — K35.32 ACUTE APPENDICITIS W/ PERFORATION AND LOCALIZED PERITONITIS, W/O ABSCESS: ICD-10-CM

## 2020-04-24 PROBLEM — Z00.00 ENCOUNTER FOR PREVENTIVE HEALTH EXAMINATION: Status: ACTIVE | Noted: 2020-04-24

## 2020-04-25 ENCOUNTER — INPATIENT (INPATIENT)
Facility: HOSPITAL | Age: 65
LOS: 3 days | Discharge: HOME | DRG: 390 | End: 2020-04-29
Attending: SURGERY | Admitting: SURGERY
Payer: COMMERCIAL

## 2020-04-25 VITALS
OXYGEN SATURATION: 97 % | TEMPERATURE: 99 F | HEIGHT: 62 IN | DIASTOLIC BLOOD PRESSURE: 67 MMHG | WEIGHT: 134.92 LBS | RESPIRATION RATE: 16 BRPM | SYSTOLIC BLOOD PRESSURE: 119 MMHG | HEART RATE: 93 BPM

## 2020-04-25 DIAGNOSIS — Z90.49 ACQUIRED ABSENCE OF OTHER SPECIFIED PARTS OF DIGESTIVE TRACT: Chronic | ICD-10-CM

## 2020-04-25 DIAGNOSIS — Z98.890 OTHER SPECIFIED POSTPROCEDURAL STATES: Chronic | ICD-10-CM

## 2020-04-25 DIAGNOSIS — R09.89 OTHER SPECIFIED SYMPTOMS AND SIGNS INVOLVING THE CIRCULATORY AND RESPIRATORY SYSTEMS: ICD-10-CM

## 2020-04-25 DIAGNOSIS — K56.609 UNSPECIFIED INTESTINAL OBSTRUCTION, UNSPECIFIED AS TO PARTIAL VERSUS COMPLETE OBSTRUCTION: ICD-10-CM

## 2020-04-25 PROBLEM — Z78.9 OTHER SPECIFIED HEALTH STATUS: Chronic | Status: ACTIVE | Noted: 2020-04-20

## 2020-04-25 LAB
ALBUMIN SERPL ELPH-MCNC: 3.1 G/DL — LOW (ref 3.3–5)
ALP SERPL-CCNC: 143 U/L — HIGH (ref 40–120)
ALT FLD-CCNC: 17 U/L — SIGNIFICANT CHANGE UP (ref 10–45)
ANION GAP SERPL CALC-SCNC: 21 MMOL/L — HIGH (ref 5–17)
AST SERPL-CCNC: 23 U/L — SIGNIFICANT CHANGE UP (ref 10–40)
BASE EXCESS BLDV CALC-SCNC: 1.7 MMOL/L — SIGNIFICANT CHANGE UP (ref -2–2)
BASOPHILS # BLD AUTO: 0 K/UL — SIGNIFICANT CHANGE UP (ref 0–0.2)
BASOPHILS NFR BLD AUTO: 0 % — SIGNIFICANT CHANGE UP (ref 0–2)
BILIRUB SERPL-MCNC: 0.3 MG/DL — SIGNIFICANT CHANGE UP (ref 0.2–1.2)
BUN SERPL-MCNC: 20 MG/DL — SIGNIFICANT CHANGE UP (ref 7–23)
CA-I SERPL-SCNC: 1.2 MMOL/L — SIGNIFICANT CHANGE UP (ref 1.12–1.3)
CALCIUM SERPL-MCNC: 9 MG/DL — SIGNIFICANT CHANGE UP (ref 8.4–10.5)
CHLORIDE BLDV-SCNC: 101 MMOL/L — SIGNIFICANT CHANGE UP (ref 96–108)
CHLORIDE SERPL-SCNC: 98 MMOL/L — SIGNIFICANT CHANGE UP (ref 96–108)
CO2 BLDV-SCNC: 29 MMOL/L — SIGNIFICANT CHANGE UP (ref 22–30)
CO2 SERPL-SCNC: 21 MMOL/L — LOW (ref 22–31)
CREAT SERPL-MCNC: 0.67 MG/DL — SIGNIFICANT CHANGE UP (ref 0.5–1.3)
D DIMER BLD IA.RAPID-MCNC: 3087 NG/ML DDU — HIGH
DACRYOCYTES BLD QL SMEAR: SLIGHT — SIGNIFICANT CHANGE UP
EOSINOPHIL # BLD AUTO: 0 K/UL — SIGNIFICANT CHANGE UP (ref 0–0.5)
EOSINOPHIL NFR BLD AUTO: 0 % — SIGNIFICANT CHANGE UP (ref 0–6)
GAS PNL BLDV: 139 MMOL/L — SIGNIFICANT CHANGE UP (ref 135–145)
GAS PNL BLDV: SIGNIFICANT CHANGE UP
GAS PNL BLDV: SIGNIFICANT CHANGE UP
GLUCOSE BLDV-MCNC: 102 MG/DL — HIGH (ref 70–99)
GLUCOSE SERPL-MCNC: 105 MG/DL — HIGH (ref 70–99)
HCO3 BLDV-SCNC: 27 MMOL/L — SIGNIFICANT CHANGE UP (ref 21–29)
HCT VFR BLD CALC: 37.8 % — SIGNIFICANT CHANGE UP (ref 34.5–45)
HCT VFR BLDA CALC: 42 % — SIGNIFICANT CHANGE UP (ref 39–50)
HGB BLD CALC-MCNC: 13.6 G/DL — SIGNIFICANT CHANGE UP (ref 11.5–15.5)
HGB BLD-MCNC: 12.5 G/DL — SIGNIFICANT CHANGE UP (ref 11.5–15.5)
LACTATE BLDV-MCNC: 1.5 MMOL/L — SIGNIFICANT CHANGE UP (ref 0.7–2)
LYMPHOCYTES # BLD AUTO: 0.57 K/UL — LOW (ref 1–3.3)
LYMPHOCYTES # BLD AUTO: 6.1 % — LOW (ref 13–44)
MANUAL SMEAR VERIFICATION: SIGNIFICANT CHANGE UP
MCHC RBC-ENTMCNC: 28.9 PG — SIGNIFICANT CHANGE UP (ref 27–34)
MCHC RBC-ENTMCNC: 33.1 GM/DL — SIGNIFICANT CHANGE UP (ref 32–36)
MCV RBC AUTO: 87.5 FL — SIGNIFICANT CHANGE UP (ref 80–100)
MONOCYTES # BLD AUTO: 0.81 K/UL — SIGNIFICANT CHANGE UP (ref 0–0.9)
MONOCYTES NFR BLD AUTO: 8.7 % — SIGNIFICANT CHANGE UP (ref 2–14)
NEUTROPHILS # BLD AUTO: 7.97 K/UL — HIGH (ref 1.8–7.4)
NEUTROPHILS NFR BLD AUTO: 85.2 % — HIGH (ref 43–77)
PCO2 BLDV: 49 MMHG — SIGNIFICANT CHANGE UP (ref 35–50)
PH BLDV: 7.37 — SIGNIFICANT CHANGE UP (ref 7.35–7.45)
PLAT MORPH BLD: NORMAL — SIGNIFICANT CHANGE UP
PLATELET # BLD AUTO: 301 K/UL — SIGNIFICANT CHANGE UP (ref 150–400)
PO2 BLDV: 22 MMHG — LOW (ref 25–45)
POIKILOCYTOSIS BLD QL AUTO: SLIGHT — SIGNIFICANT CHANGE UP
POLYCHROMASIA BLD QL SMEAR: SLIGHT — SIGNIFICANT CHANGE UP
POTASSIUM BLDV-SCNC: 3.3 MMOL/L — LOW (ref 3.5–5.3)
POTASSIUM SERPL-MCNC: 3.6 MMOL/L — SIGNIFICANT CHANGE UP (ref 3.5–5.3)
POTASSIUM SERPL-SCNC: 3.6 MMOL/L — SIGNIFICANT CHANGE UP (ref 3.5–5.3)
PROT SERPL-MCNC: 6.6 G/DL — SIGNIFICANT CHANGE UP (ref 6–8.3)
RBC # BLD: 4.32 M/UL — SIGNIFICANT CHANGE UP (ref 3.8–5.2)
RBC # FLD: 12.2 % — SIGNIFICANT CHANGE UP (ref 10.3–14.5)
RBC BLD AUTO: ABNORMAL
SAO2 % BLDV: 33 % — LOW (ref 67–88)
SODIUM SERPL-SCNC: 140 MMOL/L — SIGNIFICANT CHANGE UP (ref 135–145)
WBC # BLD: 9.36 K/UL — SIGNIFICANT CHANGE UP (ref 3.8–10.5)
WBC # FLD AUTO: 9.36 K/UL — SIGNIFICANT CHANGE UP (ref 3.8–10.5)

## 2020-04-25 PROCEDURE — 71275 CT ANGIOGRAPHY CHEST: CPT | Mod: 26

## 2020-04-25 PROCEDURE — 71045 X-RAY EXAM CHEST 1 VIEW: CPT | Mod: 26

## 2020-04-25 PROCEDURE — 74177 CT ABD & PELVIS W/CONTRAST: CPT | Mod: 26

## 2020-04-25 PROCEDURE — 71045 X-RAY EXAM CHEST 1 VIEW: CPT | Mod: 26,77

## 2020-04-25 PROCEDURE — 99285 EMERGENCY DEPT VISIT HI MDM: CPT

## 2020-04-25 RX ORDER — HYDROMORPHONE HYDROCHLORIDE 2 MG/ML
0.5 INJECTION INTRAMUSCULAR; INTRAVENOUS; SUBCUTANEOUS ONCE
Refills: 0 | Status: DISCONTINUED | OUTPATIENT
Start: 2020-04-25 | End: 2020-04-25

## 2020-04-25 RX ORDER — ONDANSETRON 8 MG/1
4 TABLET, FILM COATED ORAL ONCE
Refills: 0 | Status: COMPLETED | OUTPATIENT
Start: 2020-04-25 | End: 2020-04-25

## 2020-04-25 RX ORDER — SODIUM CHLORIDE 9 MG/ML
1000 INJECTION INTRAMUSCULAR; INTRAVENOUS; SUBCUTANEOUS
Refills: 0 | Status: DISCONTINUED | OUTPATIENT
Start: 2020-04-25 | End: 2020-04-25

## 2020-04-25 RX ORDER — ENOXAPARIN SODIUM 100 MG/ML
40 INJECTION SUBCUTANEOUS DAILY
Refills: 0 | Status: DISCONTINUED | OUTPATIENT
Start: 2020-04-25 | End: 2020-04-29

## 2020-04-25 RX ORDER — PIPERACILLIN AND TAZOBACTAM 4; .5 G/20ML; G/20ML
3.38 INJECTION, POWDER, LYOPHILIZED, FOR SOLUTION INTRAVENOUS EVERY 8 HOURS
Refills: 0 | Status: DISCONTINUED | OUTPATIENT
Start: 2020-04-25 | End: 2020-04-28

## 2020-04-25 RX ORDER — MORPHINE SULFATE 50 MG/1
4 CAPSULE, EXTENDED RELEASE ORAL ONCE
Refills: 0 | Status: DISCONTINUED | OUTPATIENT
Start: 2020-04-25 | End: 2020-04-25

## 2020-04-25 RX ORDER — SODIUM CHLORIDE 9 MG/ML
1000 INJECTION, SOLUTION INTRAVENOUS
Refills: 0 | Status: DISCONTINUED | OUTPATIENT
Start: 2020-04-25 | End: 2020-04-26

## 2020-04-25 RX ORDER — TETRACAINE/BENZOCAINE/BUTAMBEN 2%-14%-2%
1 OINTMENT (GRAM) TOPICAL ONCE
Refills: 0 | Status: COMPLETED | OUTPATIENT
Start: 2020-04-25 | End: 2020-04-25

## 2020-04-25 RX ORDER — SODIUM CHLORIDE 9 MG/ML
1000 INJECTION INTRAMUSCULAR; INTRAVENOUS; SUBCUTANEOUS ONCE
Refills: 0 | Status: COMPLETED | OUTPATIENT
Start: 2020-04-25 | End: 2020-04-25

## 2020-04-25 RX ORDER — ACETAMINOPHEN 500 MG
975 TABLET ORAL ONCE
Refills: 0 | Status: COMPLETED | OUTPATIENT
Start: 2020-04-25 | End: 2020-04-25

## 2020-04-25 RX ORDER — PIPERACILLIN AND TAZOBACTAM 4; .5 G/20ML; G/20ML
3.38 INJECTION, POWDER, LYOPHILIZED, FOR SOLUTION INTRAVENOUS ONCE
Refills: 0 | Status: COMPLETED | OUTPATIENT
Start: 2020-04-25 | End: 2020-04-25

## 2020-04-25 RX ADMIN — SODIUM CHLORIDE 125 MILLILITER(S): 9 INJECTION, SOLUTION INTRAVENOUS at 15:37

## 2020-04-25 RX ADMIN — ONDANSETRON 4 MILLIGRAM(S): 8 TABLET, FILM COATED ORAL at 12:02

## 2020-04-25 RX ADMIN — HYDROMORPHONE HYDROCHLORIDE 0.5 MILLIGRAM(S): 2 INJECTION INTRAMUSCULAR; INTRAVENOUS; SUBCUTANEOUS at 18:18

## 2020-04-25 RX ADMIN — Medication 1 SPRAY(S): at 14:57

## 2020-04-25 RX ADMIN — PIPERACILLIN AND TAZOBACTAM 25 GRAM(S): 4; .5 INJECTION, POWDER, LYOPHILIZED, FOR SOLUTION INTRAVENOUS at 21:03

## 2020-04-25 RX ADMIN — SODIUM CHLORIDE 1000 MILLILITER(S): 9 INJECTION INTRAMUSCULAR; INTRAVENOUS; SUBCUTANEOUS at 12:02

## 2020-04-25 RX ADMIN — PIPERACILLIN AND TAZOBACTAM 200 GRAM(S): 4; .5 INJECTION, POWDER, LYOPHILIZED, FOR SOLUTION INTRAVENOUS at 15:25

## 2020-04-25 RX ADMIN — Medication 975 MILLIGRAM(S): at 12:45

## 2020-04-25 RX ADMIN — ONDANSETRON 4 MILLIGRAM(S): 8 TABLET, FILM COATED ORAL at 14:22

## 2020-04-25 NOTE — ED PROVIDER NOTE - ATTENDING CONTRIBUTION TO CARE
64F s/p recent appendectomy presetnign to the ED w/ RUQ abdominal pain, nausea/vomiting x 2 days, states she was discharged 3 days ago with Augmentin/Zofran/ pain medications, but unable to toelrate PO, including meds. Denies any exacerbating/alleviating factors for her pain. Has not had pain like this before, dull achy, non-raidating.     PHYSICAL EXAMINATION:  VITALS REVIEWED.    GENERALIZED APPEARANCE:  Comfortable, no acute distress, ambulating without difficulty  SKIN:  Warm, dry, no cyanosis  HEAD:  No obvious scalp lesions  EYES:  Conjunctiva pink, no icterus  ENMT:  Mucus membranes moist, no stridor  NECK:  Supple, non-tender  CHEST AND RESPIRATORY:  L basilar rales, normal respiratory effort, speaking in full sentences  HEART AND CARDIOVASCULAR:  Regular rate, no obvious murmur  ABDOMEN AND GI: Abdomen soft, RUQ TTP, no guarding, surgical incisions without erythema, dressing C/D/I  EXTREMITIES:  No deformity, edema, or calf tenderness  NEURO: AAOx3, gross motor and sensory intact    Impression:  R/o SBO, r/o abscess, r/o PNA, r/o other etiologies; labs, imaging, monitoring, re-eval, will make surgery aware their patient is representing to the ER.

## 2020-04-25 NOTE — H&P ADULT - NSHPLABSRESULTS_GEN_ALL_CORE
LABS  CBC (04-25 @ 12:13)                              12.5                           9.36    )----------------(  301        85.2<H>% Neutrophils, 6.1<L>% Lymphocytes, ANC: 7.97<H>                              37.8      BMP (04-25 @ 12:13)             140     |  98      |  20    		Ca++ --      Ca 9.0                ---------------------------------( 105<H>		Mg 2.1                3.6     |  21<L>   |  0.67  			Ph 3.0       LFTs (04-25 @ 12:13)      TPro 6.6 / Alb 3.1<L> / TBili 0.3 / DBili -- / AST 23 / ALT 17 / AlkPhos 143<H>    VBG (04-25 @ 12:13)     7.37 / 49 / 22<L> / 27 / 1.7 / 33<L>%     Lactate: 1.5    --------------------------------------------------------------------------------------------    MICROBIOLOGY    -> .Urine Clean Catch (Midstream) Culture (04-21 @ 00:45)     NG    NG    >=3 organisms. Probable collection contamination.      --------------------------------------------------------------------------------------------  IMAGING  < from: CT Abdomen and Pelvis w/ IV Cont (04.25.20 @ 13:24) >  BOWEL: Multiple fluid-filled dilated loops of small bowel with a transition point in the lower abdomen and (3-74) and distally collapsed loops of small bowel consistent with small bowel obstruction. Right lower quadrant surgical sutures corresponding to post appendectomy.   PERITONEUM: Small volume ascites. No abscess.  VESSELS: Within normal limits.  RETROPERITONEUM/LYMPH NODES: No lymphadenopathy.    ABDOMINAL WALL: Postsurgical changes.  BONES: Degenerative changes.    CHEST:   LUNGS AND LARGE AIRWAYS: Patent central airways. Bibasilar passive atelectasis of lung bases. No nodules.  PLEURA: Bilateral pleural effusions.  VESSELS: Good opacification of pulmonary arteries. No evidence of pulmonary artery embolus.  HEART: Heart size is normal. No pericardial effusion.  MEDIASTINUM AND WAQAS: No lymphadenopathy.  CHEST WALL AND LOWER NECK: Within normal limits.    IMPRESSION:   Clear Lungs (C19V-0).  No pulmonary embolism.  Small bowel obstruction with a transition point in the distal small bowel. No abscess.   Small volume ascites.        --------------------------------------------------------------------------------------------

## 2020-04-25 NOTE — ED PROVIDER NOTE - OBJECTIVE STATEMENT
64F no PMH s/p lap appendectomy with Dr. Dobbs on 4/21 for gangrenous appendix with large perforation and appendicolith at base p/w RUQ abdominal pain with n/v x 2 days. Patient was discharged 3 days ago with Augmentin, Zofran, pain meds. Pt states she took 2 doses of Augmentin, but developed burning abdominal pain. Has been unable to tolerate PO for 2 days 2/2 nausea, including all meds. Pt states she's had no change in her abdominal pain since being discharged. Also reports some diarrhea yesterday after taking stool softener. Was instructed by Dr. Dobbs to present to ER, to be evaluated by Dr. Parikh.

## 2020-04-25 NOTE — ED PROVIDER NOTE - CLINICAL SUMMARY MEDICAL DECISION MAKING FREE TEXT BOX
Aung, PGY1 - 64F s/p lap appendectomy with Dr. Dobbs 4 days ago for gangrenous appendix with large perforation and appendicolith at base p/w RUQ abdominal pain with n/v x 2 days. Has been unable to tolerate PO, including Augmentin, Zofran, and pain meds. Also reports SOB x today. Vitals WNL. Will evaluate for abscess, PNA. Lower suspicion for PE, but will evaluate with dimer given new SOB and recent surgery. Plan: labs, cxr, CT A/P, surgery consult.

## 2020-04-25 NOTE — ED PROVIDER NOTE - NS ED ROS FT
General: Denies fevers or chills  Eyes: Denies vision changes  ENMT: Denies trouble swallowing or speaking, or sore throat  CV: Denies chest pain or palpitations  Resp: +SOB. Denies cough  GI: +Abdominal pain, nausea, vomiting, diarrhea. No constipation   : Denies painful urination, increased urinary frequency, or blood in urine  Skin: Denies new rashes  Neuro: Denies headache, numbness, or weakness  MSK: Denies recent trauma

## 2020-04-25 NOTE — ED ADULT NURSE NOTE - OBJECTIVE STATEMENT
64 y.o F A&OX3 with PMH of appendectomy (4/21) presents to the ED c.o n/v/d, weakness and SOB since Friday. Pt. reports she had appendectomy on Tuesday for gangrenous appendix with large perforation and appendicolith at base. States she was discharged jada on Thursday on Augmentin, Zofran and oxycodone. Reports she became nauseous and was unable to keep anything down so she stopped taking Augmentin. Then on Friday she developed diarrhea and has been having multiple episodes a day. Has not been able to tolerate PO intake. Endorses SOB during exertion and weakness. Pt. reports soreness to R side of abd which she reports has been improving since procedure. Pt. reports she spoke with Dr. Dobbs, and was advised to come to ED to be evaluated by Dr. Parikh. Denies fevers, CP, cough, numbness/tingling sensation at home. IV acces obtained. Safety and comfort provided. 64 y.o F A&OX3 with PMH of appendectomy (4/21) presents to the ED c.o n/v/d, and weakness since Friday. Pt. reports she had appendectomy on Tuesday for gangrenous appendix with large perforation and appendicolith at base. States she was discharged jada on Thursday on Augmentin, Zofran and oxycodone. Reports she became nauseous and was unable to keep anything down so she stopped taking Augmentin. Then on Friday she developed diarrhea and has been having multiple episodes a day. Has not been able to tolerate PO intake. Endorses weakness at this time. Pt. reports soreness to R side of abd which she reports has been improving since procedure. Pt. reports she spoke with Dr. Dobbs, and was advised to come to ED to be evaluated by Dr. Parikh. Denies fevers, CP, cough, numbness/tingling sensation at home. IV acces obtained. Safety and comfort provided.

## 2020-04-25 NOTE — ED ADULT TRIAGE NOTE - CHIEF COMPLAINT QUOTE
had appendectomy on Tuesday 4/21 and is having abdominal pain, n+v since Friday today feels short of breath

## 2020-04-25 NOTE — H&P ADULT - NSHPPHYSICALEXAM_GEN_ALL_CORE
Vitals:   T(C): 37.1 (04-25-20 @ 14:20), Max: 37.2 (04-25-20 @ 11:56)  HR: 87 (04-25-20 @ 14:20) (86 - 93)  BP: 121/65 (04-25-20 @ 14:20) (119/64 - 121/65)  RR: 22 (04-25-20 @ 14:20) (16 - 22)  SpO2: 95% (04-25-20 @ 14:20) (95% - 98%)  CAPILLARY BLOOD GLUCOSE    Height (cm): 157.48 (04-25 @ 10:45)  Weight (kg): 61.2 (04-25 @ 10:45)  BMI (kg/m2): 24.7 (04-25 @ 10:45)  BSA (m2): 1.62 (04-25 @ 10:45)    PHYSICAL EXAM:  General: Well-appearing, appears uncomfortable.  Neuro: A+Ox3  HEENT: NC/AT, EOMI  Resp: Good effort, CTA b/l  GI/Abd: Soft, distended, mild abdominal tenderness, no rebound/guarding, no masses palpated, incisions clean, dry and intact  Vascular: All 4 extremities warm.  Skin: Intact, no breakdown  Lymphatic/Nodes: No palpable lymphadenopathy  Musculoskeletal: All 4 extremities moving spontaneously, no limitations  --------------------------------------------------------------------------------------------

## 2020-04-25 NOTE — H&P ADULT - HISTORY OF PRESENT ILLNESS
Laura Toure is a 64 year old F with a pmhx/pshx gangrenous appendicitis s/p laparoscopic appendectomy on 04/21/20 who was presents with persistent nausea and vomiting since discharge on 04/23/20. The patient reports being unable to tolerate PO intake since she was sent home. She denies any associated fevers or chills, chest pain or localizing signs. She also reports some associated dyspnea and rapid breathing. She denies worsening abdominal pain but reports abdominal distension.

## 2020-04-25 NOTE — H&P ADULT - ASSESSMENT
Laura Toure is a 64 year old F with a recent history of gangrenous appendicitis s/p laparoscopic appendectomy 4 days ago who now presents with persistent nausea and vomiting since discharge 2 days ago. The patient reports mild abdominal pain, but persistent nausea, and vomiting. She also reports some associated dyspnea. The patient is afebrile and HD stable, she has some mild abdominal tenderness but notable distension, incisions CDI. An NGT was inserted at the bedside and approx. 1 L of bilious gastric content was evacuated on insertion. Labs are WNL. A CTPA was obtained to rule out a PE, this was negative. A CT abdomen/pelvis revealed a small bowel obstruction with transition point in the distal small bowel, no other focal abnormalities visualized. Patient possibly has an adhesive small bowel obstruction given recent surgical history and intra-op findings notable for gangrenous appendix with perforation. No emergent surgical intervention warranted at this time.    PLAN:  - Admission to Surgery - Green Team: Dr. ANUEL Parikh  - NPO, NGT decompression.   - Serial abdominal exams.  - IV fluid resuscitation, continue antibiotics (Zosyn resumed)  - Patient seen/examined with Sr. Resident, Dr. VINICIUS Swartz  - Plan discussed with Attending, Dr. ANUEL Parikh    Please contact Surgery - Green Team (#[phone]) with any questions or concerns.

## 2020-04-26 LAB
ANION GAP SERPL CALC-SCNC: 20 MMOL/L — HIGH (ref 5–17)
APTT BLD: 27.4 SEC — LOW (ref 27.5–36.3)
BLD GP AB SCN SERPL QL: NEGATIVE — SIGNIFICANT CHANGE UP
BUN SERPL-MCNC: 20 MG/DL — SIGNIFICANT CHANGE UP (ref 7–23)
CALCIUM SERPL-MCNC: 8.5 MG/DL — SIGNIFICANT CHANGE UP (ref 8.4–10.5)
CHLORIDE SERPL-SCNC: 100 MMOL/L — SIGNIFICANT CHANGE UP (ref 96–108)
CO2 SERPL-SCNC: 21 MMOL/L — LOW (ref 22–31)
CREAT SERPL-MCNC: 0.62 MG/DL — SIGNIFICANT CHANGE UP (ref 0.5–1.3)
GLUCOSE SERPL-MCNC: 95 MG/DL — SIGNIFICANT CHANGE UP (ref 70–99)
HCT VFR BLD CALC: 35.5 % — SIGNIFICANT CHANGE UP (ref 34.5–45)
HCV AB S/CO SERPL IA: 0.09 S/CO — SIGNIFICANT CHANGE UP (ref 0–0.99)
HCV AB SERPL-IMP: SIGNIFICANT CHANGE UP
HGB BLD-MCNC: 11.5 G/DL — SIGNIFICANT CHANGE UP (ref 11.5–15.5)
INR BLD: 0.98 RATIO — SIGNIFICANT CHANGE UP (ref 0.88–1.16)
MAGNESIUM SERPL-MCNC: 2 MG/DL — SIGNIFICANT CHANGE UP (ref 1.6–2.6)
MCHC RBC-ENTMCNC: 28.2 PG — SIGNIFICANT CHANGE UP (ref 27–34)
MCHC RBC-ENTMCNC: 32.4 GM/DL — SIGNIFICANT CHANGE UP (ref 32–36)
MCV RBC AUTO: 87 FL — SIGNIFICANT CHANGE UP (ref 80–100)
NRBC # BLD: 0 /100 WBCS — SIGNIFICANT CHANGE UP (ref 0–0)
PHOSPHATE SERPL-MCNC: 2.8 MG/DL — SIGNIFICANT CHANGE UP (ref 2.5–4.5)
PLATELET # BLD AUTO: 317 K/UL — SIGNIFICANT CHANGE UP (ref 150–400)
POTASSIUM SERPL-MCNC: 3.3 MMOL/L — LOW (ref 3.5–5.3)
POTASSIUM SERPL-SCNC: 3.3 MMOL/L — LOW (ref 3.5–5.3)
PROTHROM AB SERPL-ACNC: 11.3 SEC — SIGNIFICANT CHANGE UP (ref 10–13.1)
RBC # BLD: 4.08 M/UL — SIGNIFICANT CHANGE UP (ref 3.8–5.2)
RBC # FLD: 12.4 % — SIGNIFICANT CHANGE UP (ref 10.3–14.5)
RH IG SCN BLD-IMP: POSITIVE — SIGNIFICANT CHANGE UP
SODIUM SERPL-SCNC: 141 MMOL/L — SIGNIFICANT CHANGE UP (ref 135–145)
WBC # BLD: 11.2 K/UL — HIGH (ref 3.8–10.5)
WBC # FLD AUTO: 11.2 K/UL — HIGH (ref 3.8–10.5)

## 2020-04-26 RX ORDER — TETRACAINE/BENZOCAINE/BUTAMBEN 2%-14%-2%
1 OINTMENT (GRAM) TOPICAL
Refills: 0 | Status: DISCONTINUED | OUTPATIENT
Start: 2020-04-26 | End: 2020-04-27

## 2020-04-26 RX ORDER — ACETAMINOPHEN 500 MG
1000 TABLET ORAL ONCE
Refills: 0 | Status: COMPLETED | OUTPATIENT
Start: 2020-04-26 | End: 2020-04-26

## 2020-04-26 RX ORDER — ONDANSETRON 8 MG/1
4 TABLET, FILM COATED ORAL ONCE
Refills: 0 | Status: COMPLETED | OUTPATIENT
Start: 2020-04-26 | End: 2020-04-26

## 2020-04-26 RX ORDER — BENZOCAINE AND MENTHOL 5; 1 G/100ML; G/100ML
1 LIQUID ORAL
Refills: 0 | Status: DISCONTINUED | OUTPATIENT
Start: 2020-04-26 | End: 2020-04-27

## 2020-04-26 RX ORDER — POTASSIUM CHLORIDE 20 MEQ
10 PACKET (EA) ORAL
Refills: 0 | Status: COMPLETED | OUTPATIENT
Start: 2020-04-26 | End: 2020-04-26

## 2020-04-26 RX ORDER — DEXTROSE MONOHYDRATE, SODIUM CHLORIDE, AND POTASSIUM CHLORIDE 50; .745; 4.5 G/1000ML; G/1000ML; G/1000ML
1000 INJECTION, SOLUTION INTRAVENOUS
Refills: 0 | Status: DISCONTINUED | OUTPATIENT
Start: 2020-04-26 | End: 2020-04-28

## 2020-04-26 RX ADMIN — Medication 100 MILLIEQUIVALENT(S): at 18:09

## 2020-04-26 RX ADMIN — PIPERACILLIN AND TAZOBACTAM 25 GRAM(S): 4; .5 INJECTION, POWDER, LYOPHILIZED, FOR SOLUTION INTRAVENOUS at 14:49

## 2020-04-26 RX ADMIN — Medication 100 MILLIEQUIVALENT(S): at 13:26

## 2020-04-26 RX ADMIN — SODIUM CHLORIDE 125 MILLILITER(S): 9 INJECTION, SOLUTION INTRAVENOUS at 05:01

## 2020-04-26 RX ADMIN — ENOXAPARIN SODIUM 40 MILLIGRAM(S): 100 INJECTION SUBCUTANEOUS at 13:28

## 2020-04-26 RX ADMIN — Medication 400 MILLIGRAM(S): at 20:44

## 2020-04-26 RX ADMIN — ONDANSETRON 4 MILLIGRAM(S): 8 TABLET, FILM COATED ORAL at 05:19

## 2020-04-26 RX ADMIN — Medication 400 MILLIGRAM(S): at 00:36

## 2020-04-26 RX ADMIN — Medication 100 MILLIEQUIVALENT(S): at 15:35

## 2020-04-26 RX ADMIN — PIPERACILLIN AND TAZOBACTAM 25 GRAM(S): 4; .5 INJECTION, POWDER, LYOPHILIZED, FOR SOLUTION INTRAVENOUS at 22:59

## 2020-04-26 RX ADMIN — DEXTROSE MONOHYDRATE, SODIUM CHLORIDE, AND POTASSIUM CHLORIDE 100 MILLILITER(S): 50; .745; 4.5 INJECTION, SOLUTION INTRAVENOUS at 18:11

## 2020-04-26 RX ADMIN — PIPERACILLIN AND TAZOBACTAM 25 GRAM(S): 4; .5 INJECTION, POWDER, LYOPHILIZED, FOR SOLUTION INTRAVENOUS at 05:01

## 2020-04-26 NOTE — PROGRESS NOTE ADULT - ASSESSMENT
Laura Toure is a 64 year old F with a recent history of gangrenous appendicitis s/p laparoscopic appendectomy 4/21/20, discharged POD2, now readmitted with post-op SBO.    PLAN:  - C/w NPO, NGT decompression  - Serial abdominal exams  - Continue antibiotics: Zosyn (day 5)  - IV fluid resuscitation  - DVT ppx  - Monitor bowel function  - Monitor abdominal exam; exam prior to pain medication    [place] [facility] Surgery [id number]

## 2020-04-26 NOTE — PROVIDER CONTACT NOTE (OTHER) - SITUATION
Pt complains of nausea. Ryan oglesby patent, on continuous low wall suction, 750cc dark green/brown gastric content evacuated during shift

## 2020-04-26 NOTE — PROGRESS NOTE ADULT - SUBJECTIVE AND OBJECTIVE BOX
Interval events: Readmitted with post-op SBO. NGT placed.    S: Patient reports significant improvement in abdominal pain and distension since placement of NGT, now c/o discomfort in throat 2/2 NGT. Denies nausea, still with some abdominal pain. Not yet passing flatus or bowel movement.    O: Vital Signs  T(C): 36.7 (04-25 @ 16:15), Max: 37.2 (04-25 @ 11:56)  HR: 94 (04-25 @ 16:15) (86 - 94)  BP: 131/80 (04-25 @ 16:15) (119/64 - 136/71)  RR: 18 (04-25 @ 16:15) (16 - 22)  SpO2: 96% (04-25 @ 16:15) (95% - 98%)  04-25-20 @ 07:01  -  04-26-20 @ 00:40  --------------------------------------------------------  IN: 375 mL / OUT: 551 mL / NET: -176 mL      General: alert and oriented, NAD  Resp: airway patent, respirations unlabored  CVS: regular rate and rhythm  GI/Abdomen: NGT in place with bilious output in cannister, soft, nontender, nondistended, incisions c/d/i  Extremities: no edema  Skin: warm, dry, appropriate color                          12.5   9.36  )-----------( 301      ( 25 Apr 2020 12:13 )             37.8   04-25    140  |  98  |  20  ----------------------------<  105<H>  3.6   |  21<L>  |  0.67    Ca    9.0      25 Apr 2020 12:13  Phos  3.0     04-25  Mg     2.1     04-25    TPro  6.6  /  Alb  3.1<L>  /  TBili  0.3  /  DBili  x   /  AST  23  /  ALT  17  /  AlkPhos  143<H>  04-25 Interval events: Readmitted with post-op SBO. NGT placed with 1800cc output/24 hrs.    S: Patient reports significant improvement in abdominal pain and distension since placement of NGT, now c/o discomfort in throat 2/2 NGT. Denies nausea, still with some abdominal pain. Minimal flatus, diarrhea x1.    O: Vital Signs  T(C): 36.7 (04-25 @ 16:15), Max: 37.2 (04-25 @ 11:56)  HR: 94 (04-25 @ 16:15) (86 - 94)  BP: 131/80 (04-25 @ 16:15) (119/64 - 136/71)  RR: 18 (04-25 @ 16:15) (16 - 22)  SpO2: 96% (04-25 @ 16:15) (95% - 98%)  04-25-20 @ 07:01  -  04-26-20 @ 00:40  --------------------------------------------------------  IN: 375 mL / OUT: 551 mL / NET: -176 mL      General: alert and oriented, NAD  Resp: airway patent, respirations unlabored  CVS: regular rate and rhythm  GI/Abdomen: NGT in place with bilious output in cannister, soft, nontender, nondistended, incisions c/d/i  Extremities: no edema  Skin: warm, dry, appropriate color                          12.5   9.36  )-----------( 301      ( 25 Apr 2020 12:13 )             37.8   04-25    140  |  98  |  20  ----------------------------<  105<H>  3.6   |  21<L>  |  0.67    Ca    9.0      25 Apr 2020 12:13  Phos  3.0     04-25  Mg     2.1     04-25    TPro  6.6  /  Alb  3.1<L>  /  TBili  0.3  /  DBili  x   /  AST  23  /  ALT  17  /  AlkPhos  143<H>  04-25

## 2020-04-27 LAB
ANION GAP SERPL CALC-SCNC: 12 MMOL/L — SIGNIFICANT CHANGE UP (ref 5–17)
BUN SERPL-MCNC: 16 MG/DL — SIGNIFICANT CHANGE UP (ref 7–23)
CALCIUM SERPL-MCNC: 8.2 MG/DL — LOW (ref 8.4–10.5)
CHLORIDE SERPL-SCNC: 104 MMOL/L — SIGNIFICANT CHANGE UP (ref 96–108)
CO2 SERPL-SCNC: 25 MMOL/L — SIGNIFICANT CHANGE UP (ref 22–31)
CREAT SERPL-MCNC: 0.53 MG/DL — SIGNIFICANT CHANGE UP (ref 0.5–1.3)
GLUCOSE SERPL-MCNC: 127 MG/DL — HIGH (ref 70–99)
HCT VFR BLD CALC: 31.5 % — LOW (ref 34.5–45)
HGB BLD-MCNC: 10.6 G/DL — LOW (ref 11.5–15.5)
MAGNESIUM SERPL-MCNC: 1.9 MG/DL — SIGNIFICANT CHANGE UP (ref 1.6–2.6)
MCHC RBC-ENTMCNC: 29 PG — SIGNIFICANT CHANGE UP (ref 27–34)
MCHC RBC-ENTMCNC: 33.7 GM/DL — SIGNIFICANT CHANGE UP (ref 32–36)
MCV RBC AUTO: 86.1 FL — SIGNIFICANT CHANGE UP (ref 80–100)
NRBC # BLD: 0 /100 WBCS — SIGNIFICANT CHANGE UP (ref 0–0)
PHOSPHATE SERPL-MCNC: 1.8 MG/DL — LOW (ref 2.5–4.5)
PLATELET # BLD AUTO: 345 K/UL — SIGNIFICANT CHANGE UP (ref 150–400)
POTASSIUM SERPL-MCNC: 3.3 MMOL/L — LOW (ref 3.5–5.3)
POTASSIUM SERPL-SCNC: 3.3 MMOL/L — LOW (ref 3.5–5.3)
RBC # BLD: 3.66 M/UL — LOW (ref 3.8–5.2)
RBC # FLD: 12.4 % — SIGNIFICANT CHANGE UP (ref 10.3–14.5)
SODIUM SERPL-SCNC: 141 MMOL/L — SIGNIFICANT CHANGE UP (ref 135–145)
WBC # BLD: 10.49 K/UL — SIGNIFICANT CHANGE UP (ref 3.8–10.5)
WBC # FLD AUTO: 10.49 K/UL — SIGNIFICANT CHANGE UP (ref 3.8–10.5)

## 2020-04-27 RX ORDER — ACETAMINOPHEN 500 MG
1000 TABLET ORAL ONCE
Refills: 0 | Status: COMPLETED | OUTPATIENT
Start: 2020-04-27 | End: 2020-04-27

## 2020-04-27 RX ORDER — POTASSIUM PHOSPHATE, MONOBASIC POTASSIUM PHOSPHATE, DIBASIC 236; 224 MG/ML; MG/ML
15 INJECTION, SOLUTION INTRAVENOUS ONCE
Refills: 0 | Status: COMPLETED | OUTPATIENT
Start: 2020-04-27 | End: 2020-04-27

## 2020-04-27 RX ADMIN — POTASSIUM PHOSPHATE, MONOBASIC POTASSIUM PHOSPHATE, DIBASIC 62.5 MILLIMOLE(S): 236; 224 INJECTION, SOLUTION INTRAVENOUS at 12:52

## 2020-04-27 RX ADMIN — PIPERACILLIN AND TAZOBACTAM 25 GRAM(S): 4; .5 INJECTION, POWDER, LYOPHILIZED, FOR SOLUTION INTRAVENOUS at 13:49

## 2020-04-27 RX ADMIN — Medication 400 MILLIGRAM(S): at 03:03

## 2020-04-27 RX ADMIN — PIPERACILLIN AND TAZOBACTAM 25 GRAM(S): 4; .5 INJECTION, POWDER, LYOPHILIZED, FOR SOLUTION INTRAVENOUS at 20:36

## 2020-04-27 RX ADMIN — ENOXAPARIN SODIUM 40 MILLIGRAM(S): 100 INJECTION SUBCUTANEOUS at 11:52

## 2020-04-27 RX ADMIN — DEXTROSE MONOHYDRATE, SODIUM CHLORIDE, AND POTASSIUM CHLORIDE 100 MILLILITER(S): 50; .745; 4.5 INJECTION, SOLUTION INTRAVENOUS at 13:50

## 2020-04-27 RX ADMIN — PIPERACILLIN AND TAZOBACTAM 25 GRAM(S): 4; .5 INJECTION, POWDER, LYOPHILIZED, FOR SOLUTION INTRAVENOUS at 05:48

## 2020-04-27 NOTE — PROGRESS NOTE ADULT - ASSESSMENT
Laura Toure is a 64 year old F with a recent history of gangrenous appendicitis s/p laparoscopic appendectomy 4/21/20, discharged POD2, now readmitted with post-op SBO.    PLAN:  - C/w NPO, NGT, IVF  - Serial abdominal exams  - Continue antibiotics: Zosyn (day 6)  - DVT ppx  - Monitor bowel function  - Monitor abdominal exam; exam prior to pain medication    [place] Team Surgery [phone]

## 2020-04-27 NOTE — PROGRESS NOTE ADULT - SUBJECTIVE AND OBJECTIVE BOX
Interval Events:  - No acute events overnight    S: Patient doing well, denies fevers, chills, nausea, emesis, chest pain, SOB. Pain improving. OOB. Multiple episodes of loose BMs. +flatus.    O: Vital Signs  T(C): 36.7 (04-27 @ 01:01), Max: 37.1 (04-26 @ 08:32)  HR: 77 (04-27 @ 01:01) (77 - 95)  BP: 114/75 (04-27 @ 01:01) (114/75 - 138/83)  RR: 18 (04-27 @ 01:01) (18 - 18)  SpO2: 100% (04-27 @ 01:01) (96% - 100%)  04-25-20 @ 07:01  -  04-26-20 @ 07:00  --------------------------------------------------------  IN: 1475 mL / OUT: 800 mL / NET: 675 mL    04-26-20 @ 07:01  -  04-27-20 @ 04:33  --------------------------------------------------------  IN: 1100 mL / OUT: 850 mL / NET: 250 mL        Physical Exam:  General: NAD  Resp: respirations unlabored  CVS: regular rate and rhythm  GI/Abdomen: NGT in place with bilious output in cannister, soft, nontender, mildly distended, incisions c/d/i  Skin: warm, dry, appropriate color                          11.5   11.20 )-----------( 317      ( 26 Apr 2020 05:24 )             35.5   04-26    141  |  100  |  20  ----------------------------<  95  3.3<L>   |  21<L>  |  0.62    Ca    8.5      26 Apr 2020 05:24  Phos  2.8     04-26  Mg     2.0     04-26    TPro  6.6  /  Alb  3.1<L>  /  TBili  0.3  /  DBili  x   /  AST  23  /  ALT  17  /  AlkPhos  143<H>  04-25

## 2020-04-28 LAB
ANION GAP SERPL CALC-SCNC: 16 MMOL/L — SIGNIFICANT CHANGE UP (ref 5–17)
BUN SERPL-MCNC: 9 MG/DL — SIGNIFICANT CHANGE UP (ref 7–23)
C DIFF GDH STL QL: NEGATIVE — SIGNIFICANT CHANGE UP
C DIFF GDH STL QL: SIGNIFICANT CHANGE UP
CALCIUM SERPL-MCNC: 7.9 MG/DL — LOW (ref 8.4–10.5)
CHLORIDE SERPL-SCNC: 96 MMOL/L — SIGNIFICANT CHANGE UP (ref 96–108)
CO2 SERPL-SCNC: 23 MMOL/L — SIGNIFICANT CHANGE UP (ref 22–31)
CREAT SERPL-MCNC: 0.52 MG/DL — SIGNIFICANT CHANGE UP (ref 0.5–1.3)
GLUCOSE SERPL-MCNC: 363 MG/DL — HIGH (ref 70–99)
HCT VFR BLD CALC: 32.6 % — LOW (ref 34.5–45)
HGB BLD-MCNC: 10.6 G/DL — LOW (ref 11.5–15.5)
MAGNESIUM SERPL-MCNC: 1.8 MG/DL — SIGNIFICANT CHANGE UP (ref 1.6–2.6)
MCHC RBC-ENTMCNC: 28.3 PG — SIGNIFICANT CHANGE UP (ref 27–34)
MCHC RBC-ENTMCNC: 32.5 GM/DL — SIGNIFICANT CHANGE UP (ref 32–36)
MCV RBC AUTO: 86.9 FL — SIGNIFICANT CHANGE UP (ref 80–100)
NRBC # BLD: 0 /100 WBCS — SIGNIFICANT CHANGE UP (ref 0–0)
PHOSPHATE SERPL-MCNC: 2.5 MG/DL — SIGNIFICANT CHANGE UP (ref 2.5–4.5)
PLATELET # BLD AUTO: 380 K/UL — SIGNIFICANT CHANGE UP (ref 150–400)
POTASSIUM SERPL-MCNC: 3.4 MMOL/L — LOW (ref 3.5–5.3)
POTASSIUM SERPL-SCNC: 3.4 MMOL/L — LOW (ref 3.5–5.3)
RBC # BLD: 3.75 M/UL — LOW (ref 3.8–5.2)
RBC # FLD: 12.5 % — SIGNIFICANT CHANGE UP (ref 10.3–14.5)
SODIUM SERPL-SCNC: 135 MMOL/L — SIGNIFICANT CHANGE UP (ref 135–145)
WBC # BLD: 10.38 K/UL — SIGNIFICANT CHANGE UP (ref 3.8–10.5)
WBC # FLD AUTO: 10.38 K/UL — SIGNIFICANT CHANGE UP (ref 3.8–10.5)

## 2020-04-28 RX ORDER — SODIUM,POTASSIUM PHOSPHATES 278-250MG
1 POWDER IN PACKET (EA) ORAL ONCE
Refills: 0 | Status: COMPLETED | OUTPATIENT
Start: 2020-04-28 | End: 2020-04-28

## 2020-04-28 RX ORDER — POTASSIUM CHLORIDE 20 MEQ
40 PACKET (EA) ORAL EVERY 4 HOURS
Refills: 0 | Status: COMPLETED | OUTPATIENT
Start: 2020-04-28 | End: 2020-04-28

## 2020-04-28 RX ORDER — ZINC OXIDE 200 MG/G
1 OINTMENT TOPICAL
Refills: 0 | Status: DISCONTINUED | OUTPATIENT
Start: 2020-04-28 | End: 2020-04-29

## 2020-04-28 RX ORDER — MAGNESIUM SULFATE 500 MG/ML
1 VIAL (ML) INJECTION ONCE
Refills: 0 | Status: COMPLETED | OUTPATIENT
Start: 2020-04-28 | End: 2020-04-28

## 2020-04-28 RX ADMIN — Medication 100 GRAM(S): at 09:05

## 2020-04-28 RX ADMIN — ZINC OXIDE 1 APPLICATION(S): 200 OINTMENT TOPICAL at 21:16

## 2020-04-28 RX ADMIN — ENOXAPARIN SODIUM 40 MILLIGRAM(S): 100 INJECTION SUBCUTANEOUS at 11:22

## 2020-04-28 RX ADMIN — PIPERACILLIN AND TAZOBACTAM 25 GRAM(S): 4; .5 INJECTION, POWDER, LYOPHILIZED, FOR SOLUTION INTRAVENOUS at 04:59

## 2020-04-28 RX ADMIN — Medication 40 MILLIEQUIVALENT(S): at 10:15

## 2020-04-28 RX ADMIN — PIPERACILLIN AND TAZOBACTAM 25 GRAM(S): 4; .5 INJECTION, POWDER, LYOPHILIZED, FOR SOLUTION INTRAVENOUS at 13:23

## 2020-04-28 RX ADMIN — Medication 40 MILLIEQUIVALENT(S): at 13:23

## 2020-04-28 RX ADMIN — DEXTROSE MONOHYDRATE, SODIUM CHLORIDE, AND POTASSIUM CHLORIDE 100 MILLILITER(S): 50; .745; 4.5 INJECTION, SOLUTION INTRAVENOUS at 10:16

## 2020-04-28 RX ADMIN — Medication 1 PACKET(S): at 11:22

## 2020-04-28 NOTE — PROGRESS NOTE ADULT - SUBJECTIVE AND OBJECTIVE BOX
Interval Events:  - passed NG clamp trial. NGT removed.    S: Patient doing well, denies fevers, chills, nausea, emesis, chest pain, SOB. Pain improving. OOB. Multiple episodes of loose BMs. +flatus.    O:   Vital Signs Last 24 Hrs  T(C): 37.1 (27 Apr 2020 23:55), Max: 37.2 (27 Apr 2020 08:46)  T(F): 98.8 (27 Apr 2020 23:55), Max: 99 (27 Apr 2020 08:46)  HR: 81 (27 Apr 2020 23:55) (79 - 85)  BP: 119/71 (27 Apr 2020 23:55) (119/71 - 138/77)  BP(mean): --  RR: 18 (27 Apr 2020 23:55) (18 - 18)  SpO2: 96% (27 Apr 2020 23:55) (95% - 96%)    I&O's Detail    26 Apr 2020 07:01  -  27 Apr 2020 07:00  --------------------------------------------------------  IN:    dextrose 5% + sodium chloride 0.9% with potassium chloride 20 mEq/L: 1100 mL    IV PiggyBack: 100 mL  Total IN: 1200 mL    OUT:    Nasoenteral Tube: 900 mL  Total OUT: 900 mL    Total NET: 300 mL      27 Apr 2020 07:01  -  28 Apr 2020 01:08  --------------------------------------------------------  IN:    Oral Fluid: 630 mL  Total IN: 630 mL    OUT:    Stool: 3 mL  Total OUT: 3 mL    Total NET: 627 mL      Physical Exam:  General: NAD  Resp: respirations unlabored  CVS: regular rate and rhythm  GI/Abdomen: soft, nontender, nondistended, incisions c/d/i  Skin: warm, dry, appropriate color                                     10.6   10.49 )-----------( 345      ( 27 Apr 2020 05:36 )             31.5     04-27    141  |  104  |  16  ----------------------------<  127<H>  3.3<L>   |  25  |  0.53    Ca    8.2<L>      27 Apr 2020 05:36  Phos  1.8     04-27  Mg     1.9     04-27

## 2020-04-28 NOTE — PROGRESS NOTE ADULT - ASSESSMENT
Laura Toure is a 64 year old F with a recent history of gangrenous appendicitis s/p laparoscopic appendectomy 4/21/20, discharged POD2, now readmitted with post-op SBO. NG tube removed 4/27. Will advance diet slowly and work towards discharge.    PLAN:  - ADAT  - Serial abdominal exams  - Continue antibiotics: Zosyn (day 7)  - DVT ppx  - Monitor bowel function  - OOB    [place] [facility] Surgery [id number]

## 2020-04-29 ENCOUNTER — TRANSCRIPTION ENCOUNTER (OUTPATIENT)
Age: 65
End: 2020-04-29

## 2020-04-29 VITALS
SYSTOLIC BLOOD PRESSURE: 131 MMHG | DIASTOLIC BLOOD PRESSURE: 85 MMHG | TEMPERATURE: 99 F | RESPIRATION RATE: 18 BRPM | HEART RATE: 92 BPM | OXYGEN SATURATION: 97 %

## 2020-04-29 LAB
ANION GAP SERPL CALC-SCNC: 10 MMOL/L — SIGNIFICANT CHANGE UP (ref 5–17)
BASOPHILS # BLD AUTO: 0.04 K/UL — SIGNIFICANT CHANGE UP (ref 0–0.2)
BASOPHILS NFR BLD AUTO: 0.4 % — SIGNIFICANT CHANGE UP (ref 0–2)
BUN SERPL-MCNC: 6 MG/DL — LOW (ref 7–23)
CALCIUM SERPL-MCNC: 8.8 MG/DL — SIGNIFICANT CHANGE UP (ref 8.4–10.5)
CHLORIDE SERPL-SCNC: 108 MMOL/L — SIGNIFICANT CHANGE UP (ref 96–108)
CO2 SERPL-SCNC: 22 MMOL/L — SIGNIFICANT CHANGE UP (ref 22–31)
CREAT SERPL-MCNC: 0.55 MG/DL — SIGNIFICANT CHANGE UP (ref 0.5–1.3)
EOSINOPHIL # BLD AUTO: 0.16 K/UL — SIGNIFICANT CHANGE UP (ref 0–0.5)
EOSINOPHIL NFR BLD AUTO: 1.5 % — SIGNIFICANT CHANGE UP (ref 0–6)
GLUCOSE SERPL-MCNC: 108 MG/DL — HIGH (ref 70–99)
HCT VFR BLD CALC: 34.2 % — LOW (ref 34.5–45)
HGB BLD-MCNC: 11.1 G/DL — LOW (ref 11.5–15.5)
IMM GRANULOCYTES NFR BLD AUTO: 1.7 % — HIGH (ref 0–1.5)
LYMPHOCYTES # BLD AUTO: 1.59 K/UL — SIGNIFICANT CHANGE UP (ref 1–3.3)
LYMPHOCYTES # BLD AUTO: 15.4 % — SIGNIFICANT CHANGE UP (ref 13–44)
MAGNESIUM SERPL-MCNC: 2.2 MG/DL — SIGNIFICANT CHANGE UP (ref 1.6–2.6)
MCHC RBC-ENTMCNC: 28.3 PG — SIGNIFICANT CHANGE UP (ref 27–34)
MCHC RBC-ENTMCNC: 32.5 GM/DL — SIGNIFICANT CHANGE UP (ref 32–36)
MCV RBC AUTO: 87.2 FL — SIGNIFICANT CHANGE UP (ref 80–100)
MONOCYTES # BLD AUTO: 0.82 K/UL — SIGNIFICANT CHANGE UP (ref 0–0.9)
MONOCYTES NFR BLD AUTO: 7.9 % — SIGNIFICANT CHANGE UP (ref 2–14)
NEUTROPHILS # BLD AUTO: 7.55 K/UL — HIGH (ref 1.8–7.4)
NEUTROPHILS NFR BLD AUTO: 73.1 % — SIGNIFICANT CHANGE UP (ref 43–77)
NRBC # BLD: 0 /100 WBCS — SIGNIFICANT CHANGE UP (ref 0–0)
PHOSPHATE SERPL-MCNC: 3 MG/DL — SIGNIFICANT CHANGE UP (ref 2.5–4.5)
PLATELET # BLD AUTO: 428 K/UL — HIGH (ref 150–400)
POTASSIUM SERPL-MCNC: 4.5 MMOL/L — SIGNIFICANT CHANGE UP (ref 3.5–5.3)
POTASSIUM SERPL-SCNC: 4.5 MMOL/L — SIGNIFICANT CHANGE UP (ref 3.5–5.3)
RBC # BLD: 3.92 M/UL — SIGNIFICANT CHANGE UP (ref 3.8–5.2)
RBC # FLD: 12.9 % — SIGNIFICANT CHANGE UP (ref 10.3–14.5)
SODIUM SERPL-SCNC: 140 MMOL/L — SIGNIFICANT CHANGE UP (ref 135–145)
WBC # BLD: 10.34 K/UL — SIGNIFICANT CHANGE UP (ref 3.8–10.5)
WBC # FLD AUTO: 10.34 K/UL — SIGNIFICANT CHANGE UP (ref 3.8–10.5)

## 2020-04-29 PROCEDURE — 71275 CT ANGIOGRAPHY CHEST: CPT

## 2020-04-29 PROCEDURE — 87324 CLOSTRIDIUM AG IA: CPT

## 2020-04-29 PROCEDURE — 86803 HEPATITIS C AB TEST: CPT

## 2020-04-29 PROCEDURE — 82435 ASSAY OF BLOOD CHLORIDE: CPT

## 2020-04-29 PROCEDURE — 85014 HEMATOCRIT: CPT

## 2020-04-29 PROCEDURE — 86900 BLOOD TYPING SEROLOGIC ABO: CPT

## 2020-04-29 PROCEDURE — 85610 PROTHROMBIN TIME: CPT

## 2020-04-29 PROCEDURE — 82330 ASSAY OF CALCIUM: CPT

## 2020-04-29 PROCEDURE — 85379 FIBRIN DEGRADATION QUANT: CPT

## 2020-04-29 PROCEDURE — 82803 BLOOD GASES ANY COMBINATION: CPT

## 2020-04-29 PROCEDURE — 96376 TX/PRO/DX INJ SAME DRUG ADON: CPT | Mod: XU

## 2020-04-29 PROCEDURE — 74177 CT ABD & PELVIS W/CONTRAST: CPT

## 2020-04-29 PROCEDURE — 85027 COMPLETE CBC AUTOMATED: CPT

## 2020-04-29 PROCEDURE — 84100 ASSAY OF PHOSPHORUS: CPT

## 2020-04-29 PROCEDURE — 82947 ASSAY GLUCOSE BLOOD QUANT: CPT

## 2020-04-29 PROCEDURE — 83605 ASSAY OF LACTIC ACID: CPT

## 2020-04-29 PROCEDURE — 85730 THROMBOPLASTIN TIME PARTIAL: CPT

## 2020-04-29 PROCEDURE — 99285 EMERGENCY DEPT VISIT HI MDM: CPT | Mod: 25

## 2020-04-29 PROCEDURE — 83735 ASSAY OF MAGNESIUM: CPT

## 2020-04-29 PROCEDURE — 84295 ASSAY OF SERUM SODIUM: CPT

## 2020-04-29 PROCEDURE — 86901 BLOOD TYPING SEROLOGIC RH(D): CPT

## 2020-04-29 PROCEDURE — 87449 NOS EACH ORGANISM AG IA: CPT

## 2020-04-29 PROCEDURE — 80053 COMPREHEN METABOLIC PANEL: CPT

## 2020-04-29 PROCEDURE — 96374 THER/PROPH/DIAG INJ IV PUSH: CPT | Mod: XU

## 2020-04-29 PROCEDURE — 71045 X-RAY EXAM CHEST 1 VIEW: CPT

## 2020-04-29 PROCEDURE — 86850 RBC ANTIBODY SCREEN: CPT

## 2020-04-29 PROCEDURE — 84132 ASSAY OF SERUM POTASSIUM: CPT

## 2020-04-29 PROCEDURE — 80048 BASIC METABOLIC PNL TOTAL CA: CPT

## 2020-04-29 RX ORDER — ZINC OXIDE 200 MG/G
1 OINTMENT TOPICAL
Qty: 0 | Refills: 0 | DISCHARGE
Start: 2020-04-29

## 2020-04-29 RX ADMIN — ENOXAPARIN SODIUM 40 MILLIGRAM(S): 100 INJECTION SUBCUTANEOUS at 11:33

## 2020-04-29 NOTE — DISCHARGE NOTE PROVIDER - NSDCMRMEDTOKEN_GEN_ALL_CORE_FT
acetaminophen 325 mg oral tablet: 2 tab(s) orally every 6 hours  zinc oxide 40% topical ointment: 1 application topically 2 times a day, As needed, pain

## 2020-04-29 NOTE — DISCHARGE NOTE PROVIDER - CARE PROVIDER_API CALL
Stephen Dobbs)  Surgery  [address], [address]  [place] [address]  Phone: [phone]  Fax: [phone]  Follow Up Time: 1 week

## 2020-04-29 NOTE — DISCHARGE NOTE PROVIDER - NSDCCPCAREPLAN_GEN_ALL_CORE_FT
PRINCIPAL DISCHARGE DIAGNOSIS  Diagnosis: Small bowel obstruction  Assessment and Plan of Treatment: Resolved with nasogastric decompression and bowel rest. Now tolerating regular diet with loose bowel movements. Continue regular diet. Post-operative antibiotics completed.

## 2020-04-29 NOTE — PROGRESS NOTE ADULT - ATTENDING COMMENTS
I have seen and evaluated the patient and discussed the relevant clinical findings and plan with the surgical housestaff and fellow.  Agree with the above documentation with addenda as noted.     NGT removed, patient with (+) flatus and diarrhea, reporting watery BMs "every hour."  No abdominal pain, but does feel bloated.  Denies nausea, tolerating liquids PO.    Afebrile, NAD  Abdomen is soft, minimally distended, nontender, no guarding or rebound.    WBC 10    Will send C. diff given recent hx of abx course for perforated appendicitis  Cont clears PO  Monitor abdominal exam    Ronnie Goetz MD
Pt seen and examined  Agree with note which was reviewed and edited where appropriate.  D/W patient, RN, residents and Fellow
I have seen and evaluated the patient and discussed the relevant clinical findings and plan with the surgical housestaff and fellow.  Agree with the above documentation with addenda as noted.     Tolerating PO, +flatus  BMs more formed  C diff negative  Abdomen soft  WBC normal  Plan for D/C home today    Ronnie Goetz MD
Surgery Attending    Pt seen and examined; agree with above assessment and plan    Passed scant flatus, one loose BM    Continue NPO, NG for now.

## 2020-04-29 NOTE — DISCHARGE NOTE NURSING/CASE MANAGEMENT/SOCIAL WORK - PATIENT PORTAL LINK FT
You can access the FollowMyHealth Patient Portal offered by [facility] by registering at the following website: [email or web address]. By joining [employer]’s FollowMyHealth portal, you will also be able to view your health information using other applications (apps) compatible with our system.

## 2020-04-29 NOTE — PROGRESS NOTE ADULT - SUBJECTIVE AND OBJECTIVE BOX
Morning Surgical Progress Note  Patient is a 64y old  Female who presents with a chief complaint of Small bowel obstruction (28 Apr 2020 01:06)      SUBJECTIVE: Patient seen and examined at bedside with surgical team, patient complains of having bowel movements every hour. She denies weakness, abdominal pain and nausea. She is tolerating regular diet, but complains of fullness and a slightly reduced appetite.  Vital Signs Last 24 Hrs  T(C): 37.1 (28 Apr 2020 23:46), Max: 37.4 (28 Apr 2020 09:12)  T(F): 98.7 (28 Apr 2020 23:46), Max: 99.3 (28 Apr 2020 09:12)  HR: 92 (28 Apr 2020 23:46) (79 - 92)  BP: 119/81 (28 Apr 2020 23:46) (114/78 - 126/83)  BP(mean): --  RR: 18 (28 Apr 2020 23:46) (18 - 18)  SpO2: 95% (28 Apr 2020 23:46) (95% - 97%)I&O's Detail    27 Apr 2020 07:01  -  28 Apr 2020 07:00  --------------------------------------------------------  IN:    dextrose 5% + sodium chloride 0.9% with potassium chloride 20 mEq/L: 1200 mL    Oral Fluid: 870 mL  Total IN: 2070 mL    OUT:    Stool: 5 mL  Total OUT: 5 mL    Total NET: 2065 mL      MEDICATIONS  (STANDING):  enoxaparin Injectable 40 milliGRAM(s) SubCutaneous daily    MEDICATIONS  (PRN):  zinc oxide 40% Ointment 1 Application(s) Topical two times a day PRN pain      Physical Exam  Constitutional: A&Ox3, NAD, patient appears tired  Respiratory: CTA b/l  Cardiac: RRR, S1 and S2, no m/r/g  Gastrointestinal: abdomen soft, NT/ND    LABS:                        10.6   10.38 )-----------( 380      ( 28 Apr 2020 06:21 )             32.6     04-28    135  |  96  |  9   ----------------------------<  363<H>  3.4<L>   |  23  |  0.52    Ca    7.9<L>      28 Apr 2020 06:20  Phos  2.5     04-28  Mg     1.8     04-28

## 2020-04-29 NOTE — PROGRESS NOTE ADULT - ASSESSMENT
64 year old F with a recent history of gangrenous appendicitis s/p laparoscopic appendectomy 4/21/20, discharged POD2, who was readmitted with post-op SBO. NG tube removed 4/27. Patient tolerating regular diet but having multiple episodes of diarrhea. Patient is C diff negative.    PLAN:  - Low Fiber Diet  - Continue  antibiotics  - DVT ppx  - Monitor bowel function  - OOB  - Possible discharge later today    [facility] Surgery [id number]

## 2020-04-29 NOTE — DISCHARGE NOTE PROVIDER - HOSPITAL COURSE
Laura Toure is a 64 year old F with a pmhx/pshx gangrenous appendicitis s/p laparoscopic appendectomy on 04/21/20 who was presents with persistent nausea and vomiting since discharge on 04/23/20. The patient reports being unable to tolerate PO intake since she was sent home. CT revealed small bowel obstruction with transition point in the right lower quadrant. Patient was admitted and treated with NGT decompression, bowel rest, and continuation of antibiotics. Patient progressed, NGT was removed, and diet was advanced as tolerated. Patient began having loose bowel movements, C. diff negative. At the time of discharge, the patient was hemodynamically stable, was tolerating PO diet, was voiding urine and passing stool, was ambulating, and was comfortable with adequate pain control.

## 2020-04-29 NOTE — DISCHARGE NOTE PROVIDER - NSDCFUADDINST_GEN_ALL_CORE_FT
BATHING: Please do not submerge wound underwater. You may shower and/or sponge bathe.  ACTIVITY: No heavy lifting or straining. Otherwise, you may return to your usual level of physical activity. If you are taking narcotic pain medication (such as Percocet), do NOT drive a car, operate machinery or make important decisions.  DIET: Regular diet.  NOTIFY YOUR SURGEON IF: You have any bleeding that does not stop, any pus draining from your wound, any fever (over 100.4 F) or chills, persistent nausea/vomiting, persistent diarrhea, or if your pain is not controlled on your discharge pain medications.  FOLLOW-UP:  1. Follow-up with Dr. Dobbs within 1-2 weeks of discharge.  Please call office for appointment  2. Please follow up with your primary care physician in one week regarding your hospitalization.

## 2020-05-04 ENCOUNTER — APPOINTMENT (OUTPATIENT)
Dept: SURGERY | Facility: CLINIC | Age: 65
End: 2020-05-04
Payer: COMMERCIAL

## 2020-05-04 VITALS
RESPIRATION RATE: 16 BRPM | DIASTOLIC BLOOD PRESSURE: 68 MMHG | WEIGHT: 135 LBS | BODY MASS INDEX: 24.84 KG/M2 | TEMPERATURE: 98.2 F | HEART RATE: 93 BPM | HEIGHT: 62 IN | SYSTOLIC BLOOD PRESSURE: 119 MMHG

## 2020-05-04 DIAGNOSIS — K35.33 ACUTE APPENDICITIS W/ PERFORATION AND LOCALIZED PERITONITIS, W/ABSCESS: ICD-10-CM

## 2020-05-04 DIAGNOSIS — Z80.0 FAMILY HISTORY OF MALIGNANT NEOPLASM OF DIGESTIVE ORGANS: ICD-10-CM

## 2020-05-04 PROCEDURE — 99024 POSTOP FOLLOW-UP VISIT: CPT

## 2020-05-04 RX ORDER — ONDANSETRON 4 MG/1
4 TABLET, ORALLY DISINTEGRATING ORAL
Qty: 30 | Refills: 0 | Status: DISCONTINUED | COMMUNITY
Start: 2020-04-24 | End: 2020-05-04

## 2020-05-04 RX ORDER — AMOXICILLIN AND CLAVULANATE POTASSIUM 875; 125 MG/1; 1/1
875-125 TABLET, FILM COATED ORAL
Refills: 0 | Status: DISCONTINUED | COMMUNITY
End: 2020-05-04

## 2020-05-04 NOTE — HISTORY OF PRESENT ILLNESS
[de-identified] : Joyce is a 63 y/o female here for post-operative visit. Patient underwent laparoscopic appendectomy on 4/21/2020 and was found to have a gangrenous appendix with large perforation and appendicolith at base. She was readmitted to [facility] with a small bowel obstruction. Today, patient reports persistent right upper quadrant abdominal pain. Denies associated nausea or vomiting. She is passing gas and stool. Has relief with Motrin/ Tylenol.  Patient is doing fairly well she complains of being tired and recently over the last 3 days has had some right flank pain.  She denies nausea or vomiting she denies fevers or chills she is moving her bowels and passing gas and tolerating a diet with no nausea or vomiting her wounds are clean dry and intact

## 2020-05-04 NOTE — ASSESSMENT
[FreeTextEntry1] : 64-year-old female status post laparoscopic appendectomy for perforated gangrenous appendicitis with fecalith.  This was complicated by a postoperative small bowel obstruction approximately 1 week later.  She did well and was sent home.  She is back here for follow-up she is doing fairly well though she has developed a right flank discomfort.  She denies any fevers or chills any nausea or vomiting and it appears that she is moving her bowels well she is not bloated.  Given the above I doubt renal issues and or intra-abdominal source she seems to be better today as compared to 2 days ago if things get worse or she has any change in her clinical setting we will send her for some laboratory tests and possibly a repeat CT scan at this point we will wait a few days to see what declares.  I have explained all of this to the patient all of her questions were answered

## 2020-05-04 NOTE — REASON FOR VISIT
[Follow-Up: _____] : a [unfilled] follow-up visit [FreeTextEntry1] : Follow-up after perforated gangrenous appendicitis complicated by postop small bowel obstruction

## 2022-12-08 NOTE — DISCHARGE NOTE NURSING/CASE MANAGEMENT/SOCIAL WORK - NSDPACMPNY_GEN_ALL_CORE
Family Dupixent Counseling: I discussed with the patient the risks of dupilumab including but not limited to eye infection and irritation, cold sores, injection site reactions, worsening of asthma, allergic reactions and increased risk of parasitic infection.  Live vaccines should be avoided while taking dupilumab. Dupilumab will also interact with certain medications such as warfarin and cyclosporine. The patient understands that monitoring is required and they must alert us or the primary physician if symptoms of infection or other concerning signs are noted.

## 2023-10-15 ENCOUNTER — NON-APPOINTMENT (OUTPATIENT)
Age: 68
End: 2023-10-15

## 2023-10-16 ENCOUNTER — APPOINTMENT (OUTPATIENT)
Dept: RADIOLOGY | Facility: CLINIC | Age: 68
End: 2023-10-16
Payer: COMMERCIAL

## 2023-10-16 ENCOUNTER — APPOINTMENT (OUTPATIENT)
Dept: MAMMOGRAPHY | Facility: CLINIC | Age: 68
End: 2023-10-16
Payer: COMMERCIAL

## 2023-10-16 PROCEDURE — 77067 SCR MAMMO BI INCL CAD: CPT

## 2023-10-16 PROCEDURE — 77080 DXA BONE DENSITY AXIAL: CPT

## 2023-10-16 PROCEDURE — 77063 BREAST TOMOSYNTHESIS BI: CPT

## 2023-10-17 ENCOUNTER — NON-APPOINTMENT (OUTPATIENT)
Age: 68
End: 2023-10-17

## 2023-10-17 ENCOUNTER — APPOINTMENT (OUTPATIENT)
Dept: OTOLARYNGOLOGY | Facility: CLINIC | Age: 68
End: 2023-10-17
Payer: COMMERCIAL

## 2023-10-17 VITALS — DIASTOLIC BLOOD PRESSURE: 78 MMHG | HEART RATE: 87 BPM | SYSTOLIC BLOOD PRESSURE: 127 MMHG

## 2023-10-17 VITALS — HEIGHT: 61 IN | BODY MASS INDEX: 25.3 KG/M2 | WEIGHT: 134 LBS

## 2023-10-17 DIAGNOSIS — R42 DIZZINESS AND GIDDINESS: ICD-10-CM

## 2023-10-17 PROCEDURE — 92567 TYMPANOMETRY: CPT

## 2023-10-17 PROCEDURE — 92557 COMPREHENSIVE HEARING TEST: CPT

## 2023-10-17 PROCEDURE — 99204 OFFICE O/P NEW MOD 45 MIN: CPT

## 2023-10-20 ENCOUNTER — APPOINTMENT (OUTPATIENT)
Dept: OTOLARYNGOLOGY | Facility: CLINIC | Age: 68
End: 2023-10-20
Payer: COMMERCIAL

## 2023-10-20 PROCEDURE — 92540 BASIC VESTIBULAR EVALUATION: CPT

## 2023-10-20 PROCEDURE — 92537 CALORIC VSTBLR TEST W/REC: CPT

## 2024-02-22 NOTE — PROGRESS NOTE ADULT - SUBJECTIVE AND OBJECTIVE BOX
Interval events: S/P laparoscopic appendectomy demonstrating gangrenous appendix with large perforation and appendicolith at base. Advanced to CLD post-operatively.    S: Patient doing well, denies fevers, chills, nausea, emesis, chest pain, SOB. Pain well controlled. Not yet passing flatus or BM.    O: Vital Signs  T(C): 36.9 (04-21 @ 23:30), Max: 37.2 (04-21 @ 07:12)  HR: 94 (04-21 @ 23:30) (74 - 106)  BP: 98/62 (04-21 @ 23:30) (90/58 - 128/73)  RR: 17 (04-21 @ 23:30) (16 - 18)  SpO2: 94% (04-21 @ 23:30) (93% - 100%)  04-21-20 @ 07:01  -  04-22-20 @ 04:26  --------------------------------------------------------  IN: 1425 mL / OUT: 0 mL / NET: 1425 mL      General: alert and oriented, NAD  Resp: airway patent, respirations unlabored  CVS: regular rate and rhythm  Abdomen: soft, appropriately tender, nondistended, incisions c/d/i  Extremities: no edema  Skin: warm, dry, appropriate color                          11.5   21.76 )-----------( 178      ( 21 Apr 2020 03:23 )             35.2   04-21    139  |  104  |  12  ----------------------------<  141<H>  4.0   |  23  |  0.65    Ca    8.8      21 Apr 2020 03:23  Phos  2.7     04-21  Mg     1.8     04-21    TPro  7.2  /  Alb  4.1  /  TBili  0.5  /  DBili  x   /  AST  21  /  ALT  17  /  AlkPhos  52  04-20 MEDICATIONS  (STANDING):  melatonin. 5 milliGRAM(s) Oral at bedtime  risperiDONE   Tablet 2 milliGRAM(s) Oral two times a day    MEDICATIONS  (PRN):  diphenhydrAMINE 50 milliGRAM(s) Oral every 6 hours PRN agitation/eps prophylaxis  diphenhydrAMINE Injectable 50 milliGRAM(s) IntraMuscular once PRN severe agitation/eps prophylaxis  haloperidol     Tablet 5 milliGRAM(s) Oral every 6 hours PRN agitation  haloperidol    Injectable 5 milliGRAM(s) IntraMuscular once PRN severe agitation  LORazepam     Tablet 2 milliGRAM(s) Oral every 6 hours PRN agitation  LORazepam   Injectable 2 milliGRAM(s) IntraMuscular once PRN psychotic agitation  traZODone 50 milliGRAM(s) Oral at bedtime PRN insomnia

## 2024-05-09 ENCOUNTER — NON-APPOINTMENT (OUTPATIENT)
Age: 69
End: 2024-05-09

## 2024-05-09 DIAGNOSIS — B35.1 TINEA UNGUIUM: ICD-10-CM

## 2024-05-09 DIAGNOSIS — M72.9 FIBROBLASTIC DISORDER, UNSPECIFIED: ICD-10-CM

## 2024-10-09 ENCOUNTER — NON-APPOINTMENT (OUTPATIENT)
Age: 69
End: 2024-10-09

## 2024-10-28 ENCOUNTER — APPOINTMENT (OUTPATIENT)
Dept: MAMMOGRAPHY | Facility: CLINIC | Age: 69
End: 2024-10-28
Payer: MEDICARE

## 2024-10-28 PROCEDURE — 77067 SCR MAMMO BI INCL CAD: CPT

## 2024-10-28 PROCEDURE — 77063 BREAST TOMOSYNTHESIS BI: CPT

## 2025-01-21 ENCOUNTER — NON-APPOINTMENT (OUTPATIENT)
Age: 70
End: 2025-01-21

## 2025-02-11 NOTE — ED PROVIDER NOTE - PHYSICAL EXAMINATION
Pt tolerated a juice. Able to ambulate without difficulty. No complaints of numbness or tingling. Discharged to home in stable condition via wheelchair.  Pre-procedure pain   9/10  Post procedure Pain   0/10.   I have reviewed the triage vital signs.  Const: AAOx3, in NAD  Eyes: no conjunctival injection  HENT: NCAT, Neck supple, oral mucosa moist  CV: RRR, +S1, S2  Resp: L basilar rales, normal respiratory effort, speaking in full sentences  GI: Abdomen soft, RUQ TTP, no guarding, surgical incisions without erythema, dressing c/d/i  Extremities: No peripheral edema,  2+ radial and DP pulses  Skin: Warm, well perfused, no rash  MSK: No gross deformities appreciated  Neuro: No focal sensory or motor deficits  Psych: Appropriate mood and affect